# Patient Record
Sex: MALE | Race: OTHER | NOT HISPANIC OR LATINO | Employment: OTHER | URBAN - METROPOLITAN AREA
[De-identification: names, ages, dates, MRNs, and addresses within clinical notes are randomized per-mention and may not be internally consistent; named-entity substitution may affect disease eponyms.]

---

## 2024-02-06 ENCOUNTER — HOSPITAL ENCOUNTER (EMERGENCY)
Facility: HOSPITAL | Age: 79
Discharge: HOME | End: 2024-02-06
Attending: EMERGENCY MEDICINE
Payer: MEDICARE

## 2024-02-06 VITALS
TEMPERATURE: 99 F | DIASTOLIC BLOOD PRESSURE: 78 MMHG | SYSTOLIC BLOOD PRESSURE: 140 MMHG | HEART RATE: 74 BPM | OXYGEN SATURATION: 97 % | RESPIRATION RATE: 18 BRPM
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
## 2024-02-06 DIAGNOSIS — S40.011A CONTUSION OF MULTIPLE SITES OF RIGHT SHOULDER, INITIAL ENCOUNTER: Primary | ICD-10-CM

## 2024-02-06 DIAGNOSIS — S00.93XA CONTUSION OF HEAD, UNSPECIFIED PART OF HEAD, INITIAL ENCOUNTER: ICD-10-CM

## 2024-02-06 DIAGNOSIS — S01.81XA FACIAL LACERATION, INITIAL ENCOUNTER: ICD-10-CM

## 2024-02-06 PROCEDURE — 63600175 PHARM REV CODE 636 W HCPCS: Performed by: EMERGENCY MEDICINE

## 2024-02-06 PROCEDURE — 99285 EMERGENCY DEPT VISIT HI MDM: CPT | Mod: 25

## 2024-02-06 PROCEDURE — 90715 TDAP VACCINE 7 YRS/> IM: CPT | Performed by: EMERGENCY MEDICINE

## 2024-02-06 PROCEDURE — 90471 IMMUNIZATION ADMIN: CPT | Performed by: EMERGENCY MEDICINE

## 2024-02-06 RX ORDER — TRAMADOL HYDROCHLORIDE 50 MG/1
50 TABLET ORAL EVERY 6 HOURS PRN
Qty: 12 TABLET | Refills: 0 | Status: SHIPPED | OUTPATIENT
Start: 2024-02-06 | End: 2024-02-16

## 2024-02-06 RX ADMIN — TETANUS TOXOID, REDUCED DIPHTHERIA TOXOID AND ACELLULAR PERTUSSIS VACCINE, ADSORBED 0.5 ML: 5; 2.5; 8; 8; 2.5 SUSPENSION INTRAMUSCULAR at 02:02

## 2024-02-06 NOTE — DISCHARGE INSTRUCTIONS
There are no fractures or dislocations or evidence of intracranial injury on his imaging.  Please use ibuprofen for pain and Ultram for breakthrough pain.  Laceration of the face is well approximated with the Steri-Strips.  I would leave these in place.  Follow up with her doctor.  Return as needed

## 2024-02-06 NOTE — ED PROVIDER NOTES
SCRIBE #1 NOTE: I, Pedro Conde and Jose Pavon Jr., am scribing for, and in the presence of, Bryan Laird Jr., MD. I have scribed the entire note.       History     Chief Complaint   Patient presents with    Fall     Pt. Presents to ED via [facility] due having a seated fall today at his NH. Pt c/o right shoulder pain, and a lac noted to his right eyebrow. Pt. Denies LOC and denies blood thinners      Review of patient's allergies indicates:  No Known Allergies      History of Present Illness     HPI    Limited HPI and ROS due to patient's dementia.    2/6/2024, 11:41 AM  History obtained from the patient       History of Present Illness: Gabo Altamirano is a 78 y.o. male patient with a PMHx of dementia, PTSD, hypertension, bladder cancer, and gout who presents to the Emergency Department for evaluation of injuries due to a seated fall while at nursing home today. Pt c/o of right shoulder pain. Pt has a wound to right eyebrow ridge. Pt denies any LOC. Pt denies blood thinners.  Patient has an underlying history of dementia.  He was unable to give a very good history.  Lacerations over the right eye that has been closed with Steri-Strips      Arrival mode: [facility]    PCP: No primary care provider on file.        Past Medical History:  No past medical history on file.    Past Surgical History:  No past surgical history on file.      Family History:  No family history on file.    Social History:  Social History     Tobacco Use    Smoking status: Not on file    Smokeless tobacco: Not on file   Substance and Sexual Activity    Alcohol use: Not on file    Drug use: Not on file    Sexual activity: Not on file        Review of Systems     Review of Systems   Unable to perform ROS: Dementia   HENT:          (+) wound to right eyebrow ridge   Musculoskeletal:  Positive for arthralgias (right shoulder).   Neurological:         (-) LOC        Physical Exam     Initial Vitals [02/06/24 1128]   BP Pulse Resp Temp SpO2   (!)  142/87 78 18 98.6 °F (37 °C) 97 %      MAP       --          Physical Exam  GEN:  Underlying history of dementia.  Patient states that he is in [place] currently son states this is typical and baseline.  No acute distress.  HEENT:  Normocephalic . Extraocular muscles intact bilaterally. No evidence of entrapment.  No nasal deformity.  Nasal septum is midline.  There is no septal hematoma.  Tympanic membranes are normal. No hemotympanum.  Negative Rdz sign. No CSF leak.  Several small lacerations over the right lateral orbit.  These were closed with Steri-Strips.  These were very well approximated and healing well.  No sign of infection  CV:.  Regular rate and rhythm without gallops murmurs or rubs. 2+ pulses bilateral upper and lower extremities.  PULM:  Clear to auscultation bilaterally. No respiratory distress    Gi:  Soft nontender nondistended with normoactive bowel sounds  :.  No CVA tenderness. No suprapubic tenderness.  MS:  There is no point C/T/L/S tenderness. Normal spinal curvature.  Pelvis is stable nontender.  There is no chest wall tenderness.  Clavicles are nontender.  Mild tenderness over the lateral aspect of the right acromion.  All other bones have been palpated and joints ranged fully without tenderness or deformity.  NEURO:  II-XII intact bilaterally. No focal lateralizing signs.  Right median radial ulnar musculocutaneous and axillary nerves are intact on exam  SKIN:  Intact. No rash or laceration.        ED Course   Procedures  ED Vital Signs:  Vitals:    02/06/24 1128 02/06/24 1203 02/06/24 1204   BP: (!) 142/87 (!) 140/78    Pulse: 78  74   Resp: 18     Temp: 98.6 °F (37 °C)     TempSrc: Oral     SpO2: 97%  97%       Abnormal Lab Results:  Labs Reviewed - No data to display     All Lab Results:  No results found for this or any previous visit.      Imaging Results:  Imaging Results              CT Head Without Contrast (Final result)  Result time 02/06/24 12:57:27      Final result by  Lianet Villegas MD (02/06/24 12:57:27)                   Impression:      No acute intracranial abnormality.    Findings compatible with generalized cerebral atrophy and chronic microvascular ischemic change.      Electronically signed by: Lianet Villegas  Date:    02/06/2024  Time:    12:57               Narrative:    EXAMINATION:  CT HEAD WITHOUT CONTRAST    CLINICAL HISTORY:  Head trauma, minor (Age >= 65y);    TECHNIQUE:  Low dose axial CT images obtained throughout the head without intravenous contrast. Sagittal and coronal reconstructions were performed.    COMPARISON:  None.    FINDINGS:  Intracranial compartment:    There is diffuse ventricular and sulcal enlargement, compatible with generalized cerebral volume loss.  No hydrocephalus.  No extra-axial blood or fluid collections.    There is patchy hypoattenuation of the supratentorial white matter, most commonly seen in setting of chronic microvascular ischemic change.  No parenchymal mass, hemorrhage, edema or major vascular distribution infarct.    Skull/extracranial contents (limited evaluation): No fracture. There is mild mucosal thickening of the bilateral maxillary sinuses.  Remaining paranasal sinuses are clear.  Mastoid air cells are clear.                                       X-Ray Shoulder Trauma Right (Final result)  Result time 02/06/24 14:08:25      Final result by Tamanna Cardenas MD (02/06/24 14:08:25)                   Impression:      No acute fracture or dislocation.  Three-view suboptimal exam under penetrated      Electronically signed by: Tamanna Cardenas  Date:    02/06/2024  Time:    14:08               Narrative:    EXAMINATION:  XR SHOULDER TRAUMA 3 VIEW RIGHT    CLINICAL HISTORY:  XR SHOULDER TRAUMA 3 VIEW RIGHTUnspecified fall, initial encounter    COMPARISON:  None                                              The Emergency Provider reviewed the vital signs and test results, which are outlined above.     ED  Discussion     2:14 PM: Reassessed pt at this time. Discussed with pt all pertinent ED information and results. Discussed pt dx and plan of tx. Gave pt all f/u and return to the ED instructions. All questions and concerns were addressed at this time. Pt expresses understanding of information and instructions, and is comfortable with plan to discharge. Pt is stable for discharge.    I discussed with patient and/or family/caretaker that evaluation in the ED does not suggest any emergent or life threatening medical conditions requiring immediate intervention beyond what was provided in the ED, and I believe patient is safe for discharge.  Regardless, an unremarkable evaluation in the ED does not preclude the development or presence of a serious of life threatening condition. As such, patient was instructed to return immediately for any worsening or change in current symptoms.           Medical Decision Making  Differential diagnosis:  Fall, head contusion, intracranial injury, facial laceration, right shoulder pain, clavicle fracture    Patient was evaluated history and physical examination.  Patient was some tenderness over lateral shoulder as well as evidence of head injury.  CT imaging of the head is negative right shoulder films are negative as well.  Obvious fracture.  Patient was be discharged home with pain control.  He was laceration over his right eye however this is very well approximated with Steri-Strips and I will not make any changes at this time.  Tetanus updated.  Patient was stable safe for discharge in my opinion        Amount and/or Complexity of Data Reviewed  Independent Historian: friend and EMS  Radiology: ordered and independent interpretation performed. Decision-making details documented in ED Course.    Risk  OTC drugs.  Prescription drug management.  Decision regarding hospitalization.  Diagnosis or treatment significantly limited by social determinants of health.                ED  Medication(s):  Medications   Tdap (BOOSTRIX) vaccine injection 0.5 mL (has no administration in time range)       New Prescriptions    TRAMADOL (ULTRAM) 50 MG TABLET    Take 1 tablet (50 mg total) by mouth every 6 (six) hours as needed.               Scribe Attestation:   Scribe #1: I performed the above scribed service and the documentation accurately describes the services I performed. I attest to the accuracy of the note.     Attending:   Physician Attestation Statement for Scribe #1: I, Bryan Laird Jr., MD, personally performed the services described in this documentation, as scribed by Pedro Conde and Jose Pavon Jr., in my presence, and it is both accurate and complete.           Clinical Impression       ICD-10-CM ICD-9-CM   1. Contusion of multiple sites of right shoulder, initial encounter  S40.011A 923.09   2. Fall  W19.XXXA E888.9   3. Facial laceration, initial encounter  S01.81XA 873.40   4. Contusion of head, unspecified part of head, initial encounter  S00.93XA 920       Disposition:   Disposition: Discharged  Condition: Stable         Bryan Laird Jr., MD  02/06/24 4640

## 2024-02-06 NOTE — ED NOTES
Pt resting in ED stretcher comfortably, skin warm and dry, RR even and unlabored. BRIDGERS. NADN.

## 2024-06-21 ENCOUNTER — HOSPITAL ENCOUNTER (OUTPATIENT)
Facility: HOSPITAL | Age: 79
Discharge: HOME | End: 2024-06-22
Attending: EMERGENCY MEDICINE | Admitting: EMERGENCY MEDICINE
Payer: MEDICARE

## 2024-06-21 DIAGNOSIS — E86.0 MILD DEHYDRATION: ICD-10-CM

## 2024-06-21 DIAGNOSIS — R55 SYNCOPE: Primary | ICD-10-CM

## 2024-06-21 DIAGNOSIS — R79.89 ELEVATED TROPONIN: ICD-10-CM

## 2024-06-21 DIAGNOSIS — F02.B0 MODERATE LATE ONSET ALZHEIMER'S DEMENTIA WITHOUT BEHAVIORAL DISTURBANCE, PSYCHOTIC DISTURBANCE, MOOD DISTURBANCE, OR ANXIETY: ICD-10-CM

## 2024-06-21 DIAGNOSIS — G30.1 MODERATE LATE ONSET ALZHEIMER'S DEMENTIA WITHOUT BEHAVIORAL DISTURBANCE, PSYCHOTIC DISTURBANCE, MOOD DISTURBANCE, OR ANXIETY: ICD-10-CM

## 2024-06-21 LAB
ALBUMIN SERPL BCP-MCNC: 2.8 G/DL (ref 3.5–5.2)
ALP SERPL-CCNC: 92 U/L (ref 55–135)
ALT SERPL W/O P-5'-P-CCNC: 33 U/L (ref 10–44)
ANION GAP SERPL CALC-SCNC: 12 MMOL/L (ref 8–16)
AST SERPL-CCNC: 30 U/L (ref 10–40)
BASOPHILS # BLD AUTO: 0.1 K/UL (ref 0–0.2)
BASOPHILS NFR BLD: 0.6 % (ref 0–1.9)
BILIRUB SERPL-MCNC: 0.7 MG/DL (ref 0.1–1)
BILIRUB UR QL STRIP: NEGATIVE
BNP SERPL-MCNC: 55 PG/ML (ref 0–99)
BUN SERPL-MCNC: 12 MG/DL (ref 8–23)
CALCIUM SERPL-MCNC: 9.2 MG/DL (ref 8.7–10.5)
CHLORIDE SERPL-SCNC: 107 MMOL/L (ref 95–110)
CLARITY UR: ABNORMAL
CO2 SERPL-SCNC: 18 MMOL/L (ref 23–29)
COLOR UR: YELLOW
CREAT SERPL-MCNC: 1 MG/DL (ref 0.5–1.4)
DIFFERENTIAL METHOD BLD: ABNORMAL
EOSINOPHIL # BLD AUTO: 0.1 K/UL (ref 0–0.5)
EOSINOPHIL NFR BLD: 0.5 % (ref 0–8)
ERYTHROCYTE [DISTWIDTH] IN BLOOD BY AUTOMATED COUNT: 12.6 % (ref 11.5–14.5)
EST. GFR  (NO RACE VARIABLE): >60 ML/MIN/1.73 M^2
GLUCOSE SERPL-MCNC: 99 MG/DL (ref 70–110)
GLUCOSE UR QL STRIP: NEGATIVE
HCT VFR BLD AUTO: 44.3 % (ref 40–54)
HCV AB SERPL QL IA: NEGATIVE
HEP C VIRUS HOLD SPECIMEN: NORMAL
HGB BLD-MCNC: 14 G/DL (ref 14–18)
HGB UR QL STRIP: NEGATIVE
HIV 1+2 AB+HIV1 P24 AG SERPL QL IA: NEGATIVE
IMM GRANULOCYTES # BLD AUTO: 0.08 K/UL (ref 0–0.04)
IMM GRANULOCYTES NFR BLD AUTO: 0.5 % (ref 0–0.5)
KETONES UR QL STRIP: NEGATIVE
LEUKOCYTE ESTERASE UR QL STRIP: NEGATIVE
LYMPHOCYTES # BLD AUTO: 1.7 K/UL (ref 1–4.8)
LYMPHOCYTES NFR BLD: 10.4 % (ref 18–48)
MCH RBC QN AUTO: 29 PG (ref 27–31)
MCHC RBC AUTO-ENTMCNC: 31.6 G/DL (ref 32–36)
MCV RBC AUTO: 92 FL (ref 82–98)
MONOCYTES # BLD AUTO: 1.5 K/UL (ref 0.3–1)
MONOCYTES NFR BLD: 9.1 % (ref 4–15)
NEUTROPHILS # BLD AUTO: 12.9 K/UL (ref 1.8–7.7)
NEUTROPHILS NFR BLD: 78.9 % (ref 38–73)
NITRITE UR QL STRIP: NEGATIVE
NRBC BLD-RTO: 0 /100 WBC
OHS QRS DURATION: 88 MS
OHS QTC CALCULATION: 425 MS
PH UR STRIP: 8 [PH] (ref 5–8)
PLATELET # BLD AUTO: 319 K/UL (ref 150–450)
PMV BLD AUTO: 9.5 FL (ref 9.2–12.9)
POCT GLUCOSE: 114 MG/DL (ref 70–110)
POTASSIUM SERPL-SCNC: 4.4 MMOL/L (ref 3.5–5.1)
PROT SERPL-MCNC: 7.5 G/DL (ref 6–8.4)
PROT UR QL STRIP: NEGATIVE
RBC # BLD AUTO: 4.82 M/UL (ref 4.6–6.2)
SODIUM SERPL-SCNC: 137 MMOL/L (ref 136–145)
SP GR UR STRIP: 1.01 (ref 1–1.03)
TROPONIN I SERPL DL<=0.01 NG/ML-MCNC: 0.04 NG/ML (ref 0–0.03)
URN SPEC COLLECT METH UR: ABNORMAL
UROBILINOGEN UR STRIP-ACNC: NEGATIVE EU/DL
WBC # BLD AUTO: 16.38 K/UL (ref 3.9–12.7)

## 2024-06-21 PROCEDURE — 85025 COMPLETE CBC W/AUTO DIFF WBC: CPT | Performed by: EMERGENCY MEDICINE

## 2024-06-21 PROCEDURE — G0378 HOSPITAL OBSERVATION PER HR: HCPCS

## 2024-06-21 PROCEDURE — 80053 COMPREHEN METABOLIC PANEL: CPT | Performed by: EMERGENCY MEDICINE

## 2024-06-21 PROCEDURE — 93010 ELECTROCARDIOGRAM REPORT: CPT | Mod: ,,, | Performed by: INTERNAL MEDICINE

## 2024-06-21 PROCEDURE — 93005 ELECTROCARDIOGRAM TRACING: CPT

## 2024-06-21 PROCEDURE — 25000003 PHARM REV CODE 250: Performed by: EMERGENCY MEDICINE

## 2024-06-21 PROCEDURE — 82962 GLUCOSE BLOOD TEST: CPT

## 2024-06-21 PROCEDURE — P9612 CATHETERIZE FOR URINE SPEC: HCPCS

## 2024-06-21 PROCEDURE — 99285 EMERGENCY DEPT VISIT HI MDM: CPT | Mod: 25

## 2024-06-21 PROCEDURE — 87389 HIV-1 AG W/HIV-1&-2 AB AG IA: CPT | Performed by: EMERGENCY MEDICINE

## 2024-06-21 PROCEDURE — 86803 HEPATITIS C AB TEST: CPT | Performed by: EMERGENCY MEDICINE

## 2024-06-21 PROCEDURE — 81003 URINALYSIS AUTO W/O SCOPE: CPT | Performed by: EMERGENCY MEDICINE

## 2024-06-21 PROCEDURE — 83880 ASSAY OF NATRIURETIC PEPTIDE: CPT | Performed by: EMERGENCY MEDICINE

## 2024-06-21 PROCEDURE — 84484 ASSAY OF TROPONIN QUANT: CPT | Performed by: EMERGENCY MEDICINE

## 2024-06-21 RX ORDER — TALC
3 POWDER (GRAM) TOPICAL NIGHTLY
COMMUNITY
Start: 2024-01-09

## 2024-06-21 RX ORDER — DONEPEZIL HYDROCHLORIDE 10 MG/1
10 TABLET, FILM COATED ORAL DAILY
COMMUNITY
Start: 2024-06-19

## 2024-06-21 RX ORDER — RISPERIDONE 0.5 MG/1
0.5 TABLET ORAL DAILY
COMMUNITY

## 2024-06-21 RX ORDER — AMLODIPINE BESYLATE 10 MG/1
10 TABLET ORAL DAILY
COMMUNITY

## 2024-06-21 RX ORDER — FAMOTIDINE 20 MG/1
20 TABLET, FILM COATED ORAL 2 TIMES DAILY
COMMUNITY
Start: 2024-06-11

## 2024-06-21 RX ORDER — TALC
6 POWDER (GRAM) TOPICAL NIGHTLY PRN
Status: DISCONTINUED | OUTPATIENT
Start: 2024-06-21 | End: 2024-06-22 | Stop reason: HOSPADM

## 2024-06-21 RX ORDER — TRAMADOL HYDROCHLORIDE 50 MG/1
50 TABLET ORAL EVERY 6 HOURS PRN
Status: ON HOLD | COMMUNITY
End: 2024-06-22 | Stop reason: HOSPADM

## 2024-06-21 RX ORDER — ACETAMINOPHEN 325 MG/1
650 TABLET ORAL EVERY 4 HOURS PRN
Status: DISCONTINUED | OUTPATIENT
Start: 2024-06-21 | End: 2024-06-22 | Stop reason: HOSPADM

## 2024-06-21 RX ORDER — SENNOSIDES 8.6 MG/1
2 TABLET ORAL DAILY
COMMUNITY
Start: 2024-01-03

## 2024-06-21 RX ORDER — LACTULOSE 10 G/15ML
20 SOLUTION ORAL; RECTAL DAILY
COMMUNITY
Start: 2024-01-09

## 2024-06-21 RX ORDER — AMOXICILLIN 250 MG
1 CAPSULE ORAL 2 TIMES DAILY PRN
Status: DISCONTINUED | OUTPATIENT
Start: 2024-06-21 | End: 2024-06-22 | Stop reason: HOSPADM

## 2024-06-21 RX ORDER — DIVALPROEX SODIUM 125 MG/1
125 TABLET, DELAYED RELEASE ORAL 2 TIMES DAILY
COMMUNITY
Start: 2024-06-14

## 2024-06-21 RX ORDER — ASPIRIN 325 MG
325 TABLET ORAL
Status: COMPLETED | OUTPATIENT
Start: 2024-06-21 | End: 2024-06-21

## 2024-06-21 RX ORDER — FUROSEMIDE 20 MG/1
20 TABLET ORAL DAILY PRN
COMMUNITY
Start: 2024-06-07

## 2024-06-21 RX ORDER — SODIUM CHLORIDE 0.9 % (FLUSH) 0.9 %
10 SYRINGE (ML) INJECTION EVERY 8 HOURS
Status: DISCONTINUED | OUTPATIENT
Start: 2024-06-21 | End: 2024-06-22 | Stop reason: HOSPADM

## 2024-06-21 RX ORDER — SODIUM CHLORIDE 9 MG/ML
INJECTION, SOLUTION INTRAVENOUS CONTINUOUS
Status: DISCONTINUED | OUTPATIENT
Start: 2024-06-21 | End: 2024-06-22 | Stop reason: HOSPADM

## 2024-06-21 RX ORDER — NAPROXEN SODIUM 220 MG/1
81 TABLET, FILM COATED ORAL DAILY
COMMUNITY

## 2024-06-21 RX ORDER — RISPERIDONE 1 MG/1
1 TABLET ORAL NIGHTLY
COMMUNITY
Start: 2024-06-11

## 2024-06-21 RX ORDER — MEMANTINE HYDROCHLORIDE 10 MG/1
10 TABLET ORAL 2 TIMES DAILY
COMMUNITY

## 2024-06-21 RX ORDER — POLYETHYLENE GLYCOL 3350 17 G/17G
17 POWDER, FOR SOLUTION ORAL DAILY PRN
Status: DISCONTINUED | OUTPATIENT
Start: 2024-06-21 | End: 2024-06-22 | Stop reason: HOSPADM

## 2024-06-21 RX ADMIN — ASPIRIN 325 MG ORAL TABLET 325 MG: 325 PILL ORAL at 02:06

## 2024-06-21 NOTE — PHARMACY MED REC
"Admission Medication History     The home medication history was taken by Luc Reynolds.    You may go to "Admission" then "Reconcile Home Medications" tabs to review and/or act upon these items.     The home medication list has been updated by the Pharmacy department.   Please read ALL comments highlighted in yellow.   Please address this information as you see fit.    Feel free to contact us if you have any questions or require assistance.      Medications listed below were obtained from: Nursing home:  [facility]  (Not in a hospital admission)        Luc Reynolds  [id number]        Current Outpatient Medications on File Prior to Encounter   Medication Sig Dispense Refill Last Dose    amLODIPine (NORVASC) 10 MG tablet Take 10 mg by mouth once daily.   6/21/2024    aspirin 81 MG Chew Take 81 mg by mouth once daily.   6/21/2024    divalproex (DEPAKOTE) 125 MG EC tablet Take 125 mg by mouth 2 (two) times daily.   6/21/2024    donepeziL (ARICEPT) 10 MG tablet Take 10 mg by mouth once daily.   6/21/2024    famotidine (PEPCID) 20 MG tablet Take 20 mg by mouth 2 (two) times daily.   6/21/2024    furosemide (LASIX) 20 MG tablet Take 20 mg by mouth daily as needed.   6/21/2024    lactulose (CHRONULAC) 10 gram/15 mL solution Take 20 g by mouth once daily.   6/20/2024    melatonin (MELATIN) 3 mg tablet Take 3 mg by mouth nightly.   6/20/2024    memantine (NAMENDA) 10 MG Tab Take 10 mg by mouth 2 (two) times daily.   6/21/2024    risperiDONE (RISPERDAL) 0.5 MG Tab Take 0.5 mg by mouth once daily.   6/21/2024    risperiDONE (RISPERDAL) 1 MG tablet Take 1 mg by mouth every evening.   6/20/2024    senna (SENOKOT) 8.6 mg tablet Take 2 tablets by mouth once daily.   6/20/2024    traMADoL (ULTRAM) 50 mg tablet Take 50 mg by mouth every 6 (six) hours as needed for Pain.   5/26/2024                       .          "

## 2024-06-21 NOTE — PLAN OF CARE
"Received to room 309 via stretcher from ED. Son at bedside. Son answered the admit assessment questions as patient has AMS. Patient is oriented to self only. Bed alarm on. Cardiac monitoring in progress. Patient pulled IV out. Order for "ok to leave IV out" received. No s/s acute distress.  "

## 2024-06-21 NOTE — ED PROVIDER NOTES
SCRIBE #1 NOTE: I, José Miguel Garcia, am scribing for, and in the presence of, Andrea Baltazar DO. I have scribed the entire note.       History     Chief Complaint   Patient presents with    Altered Mental Status     Near syncope this am; staff at [facility] reports increased lethargy episode     Review of patient's allergies indicates:  No Known Allergies      History of Present Illness     HPI    6/21/2024, 11:31 AM  History obtained from the patient, patient's brother, and [facility],      History of Present Illness: Gabo Altamirano is a 78 y.o. male patient with a PMHx of dementia who presents to the Emergency Department for evaluation of AMS which onset gradually PTA. It was reported by the nursing home he is currently living in that the patient was noticeably lethargic and slumped over in his wheelchair this am. His brother mentions for the past few months he has been more downhill with more intermittent wheelchair use, less talkative, decreased memory, hallucinations, decreased bita but today in ER he seems to be more aware and responsive. Patient also notes some R foot pain.   Symptoms are constant and moderate in severity. No mitigating or exacerbating factors reported. No other associated sxs. Patient denies any seizures, abd pain, fever, dysuria, and all other sxs at this time. No further complaints or concerns at this time.       Arrival mode:  AAS    PCP: No, Primary Doctor        Past Medical History:  No past medical history on file.    Past Surgical History:  No past surgical history on file.      Family History:  No family history on file.    Social History:  Social History     Tobacco Use    Smoking status: Not on file    Smokeless tobacco: Not on file   Substance and Sexual Activity    Alcohol use: Not on file    Drug use: Not on file    Sexual activity: Not on file        Review of Systems     Review of Systems   Constitutional:  Negative for fever.   HENT:  Negative for sore throat.   "  Respiratory:  Negative for shortness of breath.    Cardiovascular:  Negative for chest pain.   Gastrointestinal:  Negative for abdominal pain and nausea.   Genitourinary:  Negative for dysuria.   Musculoskeletal:  Negative for back pain.   Skin:  Negative for rash.   Neurological:  Positive for syncope. Negative for seizures and weakness.        (+) Lethargy   Hematological:  Does not bruise/bleed easily.        Physical Exam     Initial Vitals [06/21/24 1116]   BP Pulse Resp Temp SpO2   124/80 (!) 117 18 98.5 °F (36.9 °C) 98 %      MAP       --          Physical Exam  Vitals reviewed.   Constitutional:       Appearance: Normal appearance.   HENT:      Head: Normocephalic and atraumatic.      Comments: Normal facial exam  Cardiovascular:      Comments: Tachycardic rate, regular rhythm, no murmurs noted  Pulmonary:      Effort: Pulmonary effort is normal.      Breath sounds: No wheezing.   Abdominal:      Palpations: Abdomen is soft.      Tenderness: There is no abdominal tenderness.   Musculoskeletal:         General: Normal range of motion.   Skin:     General: Skin is warm and dry.      Findings: No rash.   Neurological:      Mental Status: He is alert.      Comments: The patient is awake and alert, he knows that his name is "speedy".  He is able to answer basic questions and perform basic commands.  His brother at bedside states that this is much better than he has been for the past week.            ED Course   Procedures  ED Vital Signs:  Vitals:    06/21/24 1116 06/21/24 1402 06/21/24 1407 06/21/24 1520   BP: 124/80 122/88  (!) 109/56   Pulse: (!) 117  83 87   Resp: 18   18   Temp: 98.5 °F (36.9 °C)   98.6 °F (37 °C)   TempSrc: Oral      SpO2: 98%   98%   Weight:       Height:        06/21/24 1532 06/21/24 1540 06/21/24 1546 06/21/24 1910   BP: (!) 115/56   128/63   Pulse: 84 85  72   Resp:    18   Temp:    98.5 °F (36.9 °C)   TempSrc:       SpO2:    97%   Weight:   92.2 kg (203 lb 4.2 oz)    Height:   5' 10" " (1.778 m)     06/21/24 2000 06/22/24 0026 06/22/24 0400 06/22/24 0436   BP:  (!) 143/66  (!) 146/67   Pulse: 74 67 78 77   Resp:  18  17   Temp:  99.1 °F (37.3 °C)  98 °F (36.7 °C)   TempSrc:    Axillary   SpO2:  96%  97%   Weight:       Height:        06/22/24 0745 06/22/24 0833   BP: 123/69    Pulse: 79 93   Resp: 17    Temp: 97.7 °F (36.5 °C)    TempSrc:     SpO2: 98%    Weight:     Height:         Abnormal Lab Results:  Labs Reviewed   CBC W/ AUTO DIFFERENTIAL - Abnormal; Notable for the following components:       Result Value    WBC 16.38 (*)     MCHC 31.6 (*)     Gran # (ANC) 12.9 (*)     Immature Grans (Abs) 0.08 (*)     Mono # 1.5 (*)     Gran % 78.9 (*)     Lymph % 10.4 (*)     All other components within normal limits    Narrative:     Release to patient->Immediate   COMPREHENSIVE METABOLIC PANEL - Abnormal; Notable for the following components:    CO2 18 (*)     Albumin 2.8 (*)     All other components within normal limits    Narrative:     Release to patient->Immediate   TROPONIN I - Abnormal; Notable for the following components:    Troponin I 0.036 (*)     All other components within normal limits    Narrative:     Release to patient->Immediate   URINALYSIS, REFLEX TO URINE CULTURE - Abnormal; Notable for the following components:    Appearance, UA Hazy (*)     All other components within normal limits    Narrative:     Specimen Source->Urine   POCT GLUCOSE - Abnormal; Notable for the following components:    POCT Glucose 114 (*)     All other components within normal limits   HIV 1 / 2 ANTIBODY    Narrative:     Release to patient->Immediate   HEPATITIS C ANTIBODY    Narrative:     Release to patient->Immediate   HEP C VIRUS HOLD SPECIMEN    Narrative:     Release to patient->Immediate   B-TYPE NATRIURETIC PEPTIDE    Narrative:     Release to patient->Immediate        All Lab Results:  Results for orders placed or performed during the hospital encounter of 06/21/24   HIV 1/2 Ag/Ab (4th Gen)   Result  Value Ref Range    HIV 1/2 Ag/Ab Negative Negative   Hepatitis C Antibody   Result Value Ref Range    Hepatitis C Ab Negative Negative   HCV Virus Hold Specimen   Result Value Ref Range    HEP C Virus Hold Specimen Hold for HCV sendout    CBC auto differential   Result Value Ref Range    WBC 16.38 (H) 3.90 - 12.70 K/uL    RBC 4.82 4.60 - 6.20 M/uL    Hemoglobin 14.0 14.0 - 18.0 g/dL    Hematocrit 44.3 40.0 - 54.0 %    MCV 92 82 - 98 fL    MCH 29.0 27.0 - 31.0 pg    MCHC 31.6 (L) 32.0 - 36.0 g/dL    RDW 12.6 11.5 - 14.5 %    Platelets 319 150 - 450 K/uL    MPV 9.5 9.2 - 12.9 fL    Immature Granulocytes 0.5 0.0 - 0.5 %    Gran # (ANC) 12.9 (H) 1.8 - 7.7 K/uL    Immature Grans (Abs) 0.08 (H) 0.00 - 0.04 K/uL    Lymph # 1.7 1.0 - 4.8 K/uL    Mono # 1.5 (H) 0.3 - 1.0 K/uL    Eos # 0.1 0.0 - 0.5 K/uL    Baso # 0.10 0.00 - 0.20 K/uL    nRBC 0 0 /100 WBC    Gran % 78.9 (H) 38.0 - 73.0 %    Lymph % 10.4 (L) 18.0 - 48.0 %    Mono % 9.1 4.0 - 15.0 %    Eosinophil % 0.5 0.0 - 8.0 %    Basophil % 0.6 0.0 - 1.9 %    Differential Method Automated    Comprehensive metabolic panel   Result Value Ref Range    Sodium 137 136 - 145 mmol/L    Potassium 4.4 3.5 - 5.1 mmol/L    Chloride 107 95 - 110 mmol/L    CO2 18 (L) 23 - 29 mmol/L    Glucose 99 70 - 110 mg/dL    BUN 12 8 - 23 mg/dL    Creatinine 1.0 0.5 - 1.4 mg/dL    Calcium 9.2 8.7 - 10.5 mg/dL    Total Protein 7.5 6.0 - 8.4 g/dL    Albumin 2.8 (L) 3.5 - 5.2 g/dL    Total Bilirubin 0.7 0.1 - 1.0 mg/dL    Alkaline Phosphatase 92 55 - 135 U/L    AST 30 10 - 40 U/L    ALT 33 10 - 44 U/L    eGFR >60 >60 mL/min/1.73 m^2    Anion Gap 12 8 - 16 mmol/L   Troponin I   Result Value Ref Range    Troponin I 0.036 (H) 0.000 - 0.026 ng/mL   Brain natriuretic peptide   Result Value Ref Range    BNP 55 0 - 99 pg/mL   Urinalysis, Reflex to Urine Culture Urine, Catheterized    Specimen: Urine, Clean Catch   Result Value Ref Range    Specimen UA Urine, Catheterized     Color, UA Yellow Yellow, Straw,  Yadira    Appearance, UA Hazy (A) Clear    pH, UA 8.0 5.0 - 8.0    Specific Gravity, UA 1.010 1.005 - 1.030    Protein, UA Negative Negative    Glucose, UA Negative Negative    Ketones, UA Negative Negative    Bilirubin (UA) Negative Negative    Occult Blood UA Negative Negative    Nitrite, UA Negative Negative    Urobilinogen, UA Negative <2.0 EU/dL    Leukocytes, UA Negative Negative   CBC auto differential   Result Value Ref Range    WBC 11.50 3.90 - 12.70 K/uL    RBC 4.23 (L) 4.60 - 6.20 M/uL    Hemoglobin 12.2 (L) 14.0 - 18.0 g/dL    Hematocrit 38.4 (L) 40.0 - 54.0 %    MCV 91 82 - 98 fL    MCH 28.8 27.0 - 31.0 pg    MCHC 31.8 (L) 32.0 - 36.0 g/dL    RDW 12.7 11.5 - 14.5 %    Platelets 352 150 - 450 K/uL    MPV 9.4 9.2 - 12.9 fL    Immature Granulocytes 0.4 0.0 - 0.5 %    Gran # (ANC) 8.3 (H) 1.8 - 7.7 K/uL    Immature Grans (Abs) 0.05 (H) 0.00 - 0.04 K/uL    Lymph # 1.9 1.0 - 4.8 K/uL    Mono # 1.1 (H) 0.3 - 1.0 K/uL    Eos # 0.1 0.0 - 0.5 K/uL    Baso # 0.08 0.00 - 0.20 K/uL    nRBC 0 0 /100 WBC    Gran % 72.6 38.0 - 73.0 %    Lymph % 16.4 (L) 18.0 - 48.0 %    Mono % 9.2 4.0 - 15.0 %    Eosinophil % 0.7 0.0 - 8.0 %    Basophil % 0.7 0.0 - 1.9 %    Differential Method Automated    Comprehensive metabolic panel   Result Value Ref Range    Sodium 138 136 - 145 mmol/L    Potassium 4.0 3.5 - 5.1 mmol/L    Chloride 105 95 - 110 mmol/L    CO2 23 23 - 29 mmol/L    Glucose 93 70 - 110 mg/dL    BUN 11 8 - 23 mg/dL    Creatinine 0.9 0.5 - 1.4 mg/dL    Calcium 8.9 8.7 - 10.5 mg/dL    Total Protein 6.7 6.0 - 8.4 g/dL    Albumin 2.5 (L) 3.5 - 5.2 g/dL    Total Bilirubin 0.8 0.1 - 1.0 mg/dL    Alkaline Phosphatase 77 55 - 135 U/L    AST 24 10 - 40 U/L    ALT 29 10 - 44 U/L    eGFR >60 >60 mL/min/1.73 m^2    Anion Gap 10 8 - 16 mmol/L   Troponin I   Result Value Ref Range    Troponin I 0.036 (H) 0.000 - 0.026 ng/mL   EKG 12-lead   Result Value Ref Range    QRS Duration 88 ms    [facility] QTC Calculation 425 ms   POCT glucose    Result Value Ref Range    POCT Glucose 114 (H) 70 - 110 mg/dL         Imaging Results:  Imaging Results              X-Ray Chest AP Portable (Final result)  Result time 06/21/24 12:00:37      Final result by Marcus Fink MD (06/21/24 12:00:37)                   Impression:      No acute findings.      Electronically signed by: Marcus Fink MD  Date:    06/21/2024  Time:    12:00               Narrative:    EXAMINATION:  XR CHEST AP PORTABLE    CLINICAL HISTORY:  syncope;    TECHNIQUE:  Single frontal view of the chest was performed.    COMPARISON:  None    FINDINGS:  The cardiomediastinal silhouette is normal.  Partly rotated.    The lungs are clear.  No pleural effusions.    No acute osseous findings.  No advanced arthritic changes.                                       The EKG was ordered, reviewed, and independently interpreted by the ED provider.  Interpretation time: 11:40  Rate: 92 BPM  Rhythm: normal sinus rhythm  Interpretation: Left axis deviation. Abnormal ECG. No STEMI.             The Emergency Provider reviewed the vital signs and test results, which are outlined above.     ED Discussion     2:04 PM: Discussed case with REBEKAH Katz ([facility]). Dr. Madden agrees with current care and management of pt and accepts admission.   Admitting Service:   Admitting Physician: Dr. Madden  Admit to: obs/tele    2:04 PM: Re-evaluated pt. I have discussed test results, shared treatment plan, and the need for admission with patient and family at bedside. Pt and family express understanding at this time and agree with all information. All questions answered. Pt and family have no further questions or concerns at this time. Pt is ready for admit.        ED Course as of 06/22/24 2207 Fri Jun 21, 2024   1159 POCT glucose(!)  Hyperglycemia [CD]   1202 CBC auto differential(!)  Leukocytosis noted [CD]   1206 X-Ray Chest AP Portable  No acute findings [CD]   1238 Comprehensive metabolic  panel(!)  Nonspecific findings [CD]   1255 Troponin I(!)  Elevation [CD]   1257 Brain natriuretic peptide  Within normal limits [CD]   1300 Hepatitis C Antibody  Negative [CD]   1309 HIV 1/2 Ag/Ab (4th Gen)  Negative [CD]   1354 Urinalysis, Reflex to Urine Culture Urine, Catheterized(!)  No UTI [CD]      ED Course User Index  [CD] Andrea Baltazar DO     Medical Decision Making  This patient had a syncopal episode from a seated position while at the nursing home.  Workup shows evidence for elevated troponin with no other troponins to compare to.  Because of this Hospital Medicine was consulted for admission and which they accepted for further workup and treatment.    Amount and/or Complexity of Data Reviewed  Labs: ordered. Decision-making details documented in ED Course.  Radiology: ordered and independent interpretation performed. Decision-making details documented in ED Course.  ECG/medicine tests: ordered and independent interpretation performed. Decision-making details documented in ED Course.    Risk  OTC drugs.  Risk Details: Differential diagnosis includes but is not limited to: ACS, heart failure, dysrhythmia, dehydration, electrolyte abnormality                ED Medication(s):  Medications   aspirin tablet 325 mg (325 mg Oral Given 6/21/24 1424)       Discharge Medication List as of 6/22/2024 12:42 PM           Follow-up Information       No, Primary Doctor Follow up.    Why: FOLLOW UP WITH PCP AT [facility]                               Scribe Attestation:   Scribe #1: I performed the above scribed service and the documentation accurately describes the services I performed. I attest to the accuracy of the note.     Attending:   Physician Attestation Statement for Scribe #1: I, Andrea Baltazar DO, personally performed the services described in this documentation, as scribed by José Miguel Garcia, in my presence, and it is both accurate and complete.           Clinical Impression        ICD-10-CM ICD-9-CM   1. Syncope  R55 780.2   2. Elevated troponin  R79.89 790.6   3. Mild dehydration  E86.0 276.51   4. Moderate late onset Alzheimer's dementia without behavioral disturbance, psychotic disturbance, mood disturbance, or anxiety  G30.1 331.0    F02.B0 294.10       Disposition:   Disposition: Placed in Observation  Condition: Andrea Fregoso,   06/22/24 5145

## 2024-06-22 VITALS
DIASTOLIC BLOOD PRESSURE: 69 MMHG | RESPIRATION RATE: 17 BRPM | TEMPERATURE: 98 F | WEIGHT: 203.25 LBS | HEART RATE: 93 BPM | HEIGHT: 70 IN | BODY MASS INDEX: 29.1 KG/M2 | SYSTOLIC BLOOD PRESSURE: 123 MMHG | OXYGEN SATURATION: 98 %

## 2024-06-22 LAB
ALBUMIN SERPL BCP-MCNC: 2.5 G/DL (ref 3.5–5.2)
ALP SERPL-CCNC: 77 U/L (ref 55–135)
ALT SERPL W/O P-5'-P-CCNC: 29 U/L (ref 10–44)
ANION GAP SERPL CALC-SCNC: 10 MMOL/L (ref 8–16)
AST SERPL-CCNC: 24 U/L (ref 10–40)
BASOPHILS # BLD AUTO: 0.08 K/UL (ref 0–0.2)
BASOPHILS NFR BLD: 0.7 % (ref 0–1.9)
BILIRUB SERPL-MCNC: 0.8 MG/DL (ref 0.1–1)
BUN SERPL-MCNC: 11 MG/DL (ref 8–23)
CALCIUM SERPL-MCNC: 8.9 MG/DL (ref 8.7–10.5)
CHLORIDE SERPL-SCNC: 105 MMOL/L (ref 95–110)
CO2 SERPL-SCNC: 23 MMOL/L (ref 23–29)
CREAT SERPL-MCNC: 0.9 MG/DL (ref 0.5–1.4)
DIFFERENTIAL METHOD BLD: ABNORMAL
EOSINOPHIL # BLD AUTO: 0.1 K/UL (ref 0–0.5)
EOSINOPHIL NFR BLD: 0.7 % (ref 0–8)
ERYTHROCYTE [DISTWIDTH] IN BLOOD BY AUTOMATED COUNT: 12.7 % (ref 11.5–14.5)
EST. GFR  (NO RACE VARIABLE): >60 ML/MIN/1.73 M^2
GLUCOSE SERPL-MCNC: 93 MG/DL (ref 70–110)
HCT VFR BLD AUTO: 38.4 % (ref 40–54)
HGB BLD-MCNC: 12.2 G/DL (ref 14–18)
IMM GRANULOCYTES # BLD AUTO: 0.05 K/UL (ref 0–0.04)
IMM GRANULOCYTES NFR BLD AUTO: 0.4 % (ref 0–0.5)
LYMPHOCYTES # BLD AUTO: 1.9 K/UL (ref 1–4.8)
LYMPHOCYTES NFR BLD: 16.4 % (ref 18–48)
MCH RBC QN AUTO: 28.8 PG (ref 27–31)
MCHC RBC AUTO-ENTMCNC: 31.8 G/DL (ref 32–36)
MCV RBC AUTO: 91 FL (ref 82–98)
MONOCYTES # BLD AUTO: 1.1 K/UL (ref 0.3–1)
MONOCYTES NFR BLD: 9.2 % (ref 4–15)
NEUTROPHILS # BLD AUTO: 8.3 K/UL (ref 1.8–7.7)
NEUTROPHILS NFR BLD: 72.6 % (ref 38–73)
NRBC BLD-RTO: 0 /100 WBC
PLATELET # BLD AUTO: 352 K/UL (ref 150–450)
PMV BLD AUTO: 9.4 FL (ref 9.2–12.9)
POTASSIUM SERPL-SCNC: 4 MMOL/L (ref 3.5–5.1)
PROT SERPL-MCNC: 6.7 G/DL (ref 6–8.4)
RBC # BLD AUTO: 4.23 M/UL (ref 4.6–6.2)
SODIUM SERPL-SCNC: 138 MMOL/L (ref 136–145)
TROPONIN I SERPL DL<=0.01 NG/ML-MCNC: 0.04 NG/ML (ref 0–0.03)
WBC # BLD AUTO: 11.5 K/UL (ref 3.9–12.7)

## 2024-06-22 PROCEDURE — 80053 COMPREHEN METABOLIC PANEL: CPT

## 2024-06-22 PROCEDURE — G0378 HOSPITAL OBSERVATION PER HR: HCPCS

## 2024-06-22 PROCEDURE — 85025 COMPLETE CBC W/AUTO DIFF WBC: CPT

## 2024-06-22 PROCEDURE — 84484 ASSAY OF TROPONIN QUANT: CPT

## 2024-06-22 PROCEDURE — 36415 COLL VENOUS BLD VENIPUNCTURE: CPT

## 2024-06-22 PROCEDURE — 25000003 PHARM REV CODE 250

## 2024-06-22 RX ADMIN — COLCHICINE 0.3 MG: 0.6 TABLET, FILM COATED ORAL at 11:06

## 2024-06-22 NOTE — DISCHARGE SUMMARY
[facility]  [facility]  Discharge Summary      Patient Name: Gabo Altamirano  MRN: [id number]  NICO: [id number]  Patient Class: OP- Observation  Admission Date: 6/21/2024  Hospital Length of Stay: 0 days  Discharge Date and Time:  06/22/2024 9:38 AM  Attending Physician: Alfreda Madden MD   Discharging Provider: Alanna Sofia NP  Primary Care Provider: Maru, Primary Doctor    Primary Care Team: Networked reference to record PCT     HPI:   Gabo Altamirano is a 78 y.o. male patient with a PMHx of dementia, NH resident, sent to ER via EMS for AMS which onset gradually PTA. It was reported by the [facility] that the patient was noticeably lethargic and lumped over in his wheelchair this am. His brother mentions for the past few months he has been more downhill with more intermittent wheelchair use, less talkative, decreased memory, hallucinations, decreased bita but today in ER he seems to be more aware and responsive. Patient also notes some R foot pain.   Symptoms are constant and moderate in severity. No mitigating or exacerbating factors reported. No other associated sxs. Patient denies any seizures, abd pain, fever, dysuria, and all other sxs at this time. No further complaints or concerns at this time.     In the ER, VSS Afeb, , Labs WNL except WBC 16.4, H/H 14/43, CO2 18, Trop mildly elevated at 0.036, CXR Clear, EKG NSR. No ischemia. Pt is being placed in Obs under Hosp Med on Med Tele for near syncope and mild Dehydration.     * No surgery found *      Hospital Course:   78 year old male presented to the ER for AMS which onset gradually PTA.     In the ER, VSS Afeb, , Labs WNL except WBC 16.4, H/H 14/43, CO2 18, Trop mildly elevated at 0.036, CXR Clear, EKG NSR. No ischemia.     Patient given IVF overnight for mild dehydration. Patient awake and alert this morning eating breakfast. Mental status at baseline, patient remains only oriented to self. No focal deficits  noted. Patients labs overall stable from previous. Family reported patient has been progressively declining over the past couple of months. Advised to encourage PO intake as frequently as possible to ensure patient remains hydrated. Follow up with PCP at NH as soon as possible. Patient to be discharged back to [facility].        Patient seen and examined on the day of discharge.  All questions and concerns were addressed prior to discharge.    Face to face encounter with patient: 46 min      Goals of Care Treatment Preferences:  Code Status: Full Code      Consults:   Consults (From admission, onward)          Status Ordering Provider     Inpatient consult to Social Work  Once        Provider:  (Not yet assigned)    Completed DANGELO MORILLO            Neuro  Moderate late onset Alzheimer's dementia without behavioral disturbance, psychotic disturbance, mood disturbance, or anxiety  Patient with dementia with likely etiology of unknown dementia. Dementia is moderate. The patient does not have signs of behavioral disturbance. Home dementia medications are Held or Continued: held.. Continue non-pharmacologic interventions to prevent delirium (No VS between 11PM-5AM, activity during day, opening blinds, providing glasses/hearing aids, and up in chair during daytime). Will avoid narcotics and benzos unless absolutely necessary. PRN anti-psychotics are not prescribed to avoid self harm behaviors.    -Slowly progressive, pt is now dependent for almost all his ADLs, NH resident  -discharged back to nursing home      Renal/  Mild dehydration  Likely sec to poor intake  No NVD, use of diuretics etc  Pt is WC bound, with progressively decreased mobility over the last few months per son and pt's brother  Rehydrate w NS overnight     -labs improved and stable for discharge   -patient awake, alert and eating breakfast this morning         Other  * Near syncope  Likely sec to Dehydration. No evidence of any cardiac  "syncope or vasovagal, no sepsis, MI etc  Pt has already perked up in the ER  Needs gentle rehydration with NS      -patient back to baseline, much more awake and alert. Sitting up in bed eating breakfast this morning.  -overall labs stable for discharge   -discharged back to nursing home       Final Active Diagnoses:    Diagnosis Date Noted POA    PRINCIPAL PROBLEM:  Near syncope [R55] 06/21/2024 Yes    Mild dehydration [E86.0] 06/21/2024 Yes    Moderate late onset Alzheimer's dementia without behavioral disturbance, psychotic disturbance, mood disturbance, or anxiety [G30.1, F02.B0] 06/21/2024 Yes      Problems Resolved During this Admission:       Discharged Condition: good    Disposition: Home or Self Care    Follow Up:   Follow-up Information       No, Primary Doctor Follow up.    Why: FOLLOW UP WITH PCP AT [facility]                         Patient Instructions:      Diet Cardiac     Activity as tolerated       Significant Diagnostic Studies: Labs: BMP:   Recent Labs   Lab 06/21/24  1155 06/22/24  0743   GLU 99 93    138   K 4.4 4.0    105   CO2 18* 23   BUN 12 11   CREATININE 1.0 0.9   CALCIUM 9.2 8.9   , CMP   Recent Labs   Lab 06/21/24  1155 06/22/24  0743    138   K 4.4 4.0    105   CO2 18* 23   GLU 99 93   BUN 12 11   CREATININE 1.0 0.9   CALCIUM 9.2 8.9   PROT 7.5 6.7   ALBUMIN 2.8* 2.5*   BILITOT 0.7 0.8   ALKPHOS 92 77   AST 30 24   ALT 33 29   ANIONGAP 12 10   , CBC   Recent Labs   Lab 06/21/24  1155 06/22/24  0743   WBC 16.38* 11.50   HGB 14.0 12.2*   HCT 44.3 38.4*    352   , INR No results found for: "INR", "PROTIME", Lipid Panel   Lab Results   Component Value Date    CHOL 162 05/06/2021    HDL 43 05/06/2021    LDLCALC 105 (H) 05/06/2021    TRIG 74 05/06/2021   , Troponin   Recent Labs   Lab 06/21/24  1155 06/22/24  0743   TROPONINI 0.036* 0.036*   , A1C: No results for input(s): "HGBA1C" in the last 4320 hours., and All labs within the past 24 hours " have been reviewed  Radiology: X-Ray: CXR: X-Ray Chest 1 View (CXR): No results found for this visit on 06/21/24. and X-Ray Chest PA and Lateral (CXR): No results found for this visit on 06/21/24. and KUB: X-Ray Abdomen AP 1 View (KUB): No results found for this visit on 06/21/24.  Cardiac Graphics: ECG: Normal sinus rhythm     Pending Diagnostic Studies:       None           Medications:  Reconciled Home Medications:      Medication List        CONTINUE taking these medications      amLODIPine 10 MG tablet  Commonly known as: NORVASC  Take 10 mg by mouth once daily.     aspirin 81 MG Chew  Take 81 mg by mouth once daily.     divalproex 125 MG EC tablet  Commonly known as: DEPAKOTE  Take 125 mg by mouth 2 (two) times daily.     donepeziL 10 MG tablet  Commonly known as: ARICEPT  Take 10 mg by mouth once daily.     famotidine 20 MG tablet  Commonly known as: PEPCID  Take 20 mg by mouth 2 (two) times daily.     furosemide 20 MG tablet  Commonly known as: LASIX  Take 20 mg by mouth daily as needed.     lactulose 10 gram/15 mL solution  Commonly known as: CHRONULAC  Take 20 g by mouth once daily.     melatonin 3 mg tablet  Commonly known as: MELATIN  Take 3 mg by mouth nightly.     memantine 10 MG Tab  Commonly known as: NAMENDA  Take 10 mg by mouth 2 (two) times daily.     * risperiDONE 0.5 MG Tab  Commonly known as: RISPERDAL  Take 0.5 mg by mouth once daily.     * risperiDONE 1 MG tablet  Commonly known as: RISPERDAL  Take 1 mg by mouth every evening.     senna 8.6 mg tablet  Commonly known as: SENOKOT  Take 2 tablets by mouth once daily.           * This list has 2 medication(s) that are the same as other medications prescribed for you. Read the directions carefully, and ask your doctor or other care provider to review them with you.                STOP taking these medications      traMADoL 50 mg tablet  Commonly known as: ULTRAM              Indwelling Lines/Drains at time of discharge:   Lines/Drains/Airways        None                   Time spent on the discharge of patient: 55 minutes     The patient's case has been reviewed by my supervising physician.   Supervising physician will provide additional orders and recommendations at his/her discretion.  Please see supervising physicians documentation and/or orders for further details.       Alanna Sofia NP  Department of Hospital Medicine  [facility]

## 2024-06-22 NOTE — SUBJECTIVE & OBJECTIVE
No past medical history on file.    No past surgical history on file.    Review of patient's allergies indicates:  No Known Allergies    No current facility-administered medications on file prior to encounter.     Current Outpatient Medications on File Prior to Encounter   Medication Sig    amLODIPine (NORVASC) 10 MG tablet Take 10 mg by mouth once daily.    aspirin 81 MG Chew Take 81 mg by mouth once daily.    divalproex (DEPAKOTE) 125 MG EC tablet Take 125 mg by mouth 2 (two) times daily.    donepeziL (ARICEPT) 10 MG tablet Take 10 mg by mouth once daily.    famotidine (PEPCID) 20 MG tablet Take 20 mg by mouth 2 (two) times daily.    furosemide (LASIX) 20 MG tablet Take 20 mg by mouth daily as needed.    lactulose (CHRONULAC) 10 gram/15 mL solution Take 20 g by mouth once daily.    melatonin (MELATIN) 3 mg tablet Take 3 mg by mouth nightly.    memantine (NAMENDA) 10 MG Tab Take 10 mg by mouth 2 (two) times daily.    risperiDONE (RISPERDAL) 0.5 MG Tab Take 0.5 mg by mouth once daily.    risperiDONE (RISPERDAL) 1 MG tablet Take 1 mg by mouth every evening.    senna (SENOKOT) 8.6 mg tablet Take 2 tablets by mouth once daily.    traMADoL (ULTRAM) 50 mg tablet Take 50 mg by mouth every 6 (six) hours as needed for Pain.     Family History    None       Tobacco Use    Smoking status: Not on file    Smokeless tobacco: Not on file   Substance and Sexual Activity    Alcohol use: Not on file    Drug use: Not on file    Sexual activity: Not on file     Review of Systems   Constitutional:  Positive for activity change, appetite change and fatigue. Negative for chills, diaphoresis and fever.   HENT: Negative.  Negative for congestion, ear pain, facial swelling, hearing loss, nosebleeds, postnasal drip, rhinorrhea, sinus pressure, sneezing, sore throat, trouble swallowing and voice change.    Eyes: Negative.  Negative for pain and redness.   Respiratory: Negative.  Negative for cough, chest tightness, shortness of breath and  wheezing.    Cardiovascular: Negative.  Negative for chest pain, palpitations and leg swelling.   Gastrointestinal:  Negative for abdominal pain, blood in stool, constipation, diarrhea, nausea and vomiting.   Endocrine: Negative for cold intolerance, heat intolerance, polydipsia, polyphagia and polyuria.   Genitourinary:  Negative for difficulty urinating, dysuria, flank pain, frequency, hematuria, scrotal swelling, testicular pain and urgency.   Musculoskeletal:  Negative for arthralgias, back pain, gait problem, joint swelling, myalgias, neck pain and neck stiffness.   Skin:  Negative for pallor, rash and wound.   Allergic/Immunologic: Negative for environmental allergies and food allergies.   Neurological:  Positive for light-headedness. Negative for dizziness, tremors, seizures, syncope, facial asymmetry, speech difficulty, weakness, numbness and headaches.   Hematological: Negative.  Does not bruise/bleed easily.   Psychiatric/Behavioral:  Positive for decreased concentration.    All other systems reviewed and are negative.    Objective:     Vital Signs (Most Recent):  Temp: 98.5 °F (36.9 °C) (06/21/24 1910)  Pulse: 72 (06/21/24 1910)  Resp: 18 (06/21/24 1910)  BP: 128/63 (06/21/24 1910)  SpO2: 97 % (06/21/24 1910) Vital Signs (24h Range):  Temp:  [98.5 °F (36.9 °C)-98.6 °F (37 °C)] 98.5 °F (36.9 °C)  Pulse:  [] 72  Resp:  [18] 18  SpO2:  [97 %-98 %] 97 %  BP: (109-128)/(56-88) 128/63     Weight: 92.2 kg (203 lb 4.2 oz)  Body mass index is 29.17 kg/m².     Physical Exam  Vitals and nursing note reviewed.   Constitutional:       General: He is not in acute distress.     Appearance: Normal appearance. He is well-developed. He is not ill-appearing, toxic-appearing or diaphoretic.      Comments: Elderly man, AAOx3, pleasantly confused   HENT:      Head: Normocephalic and atraumatic.      Right Ear: External ear normal.      Left Ear: External ear normal.      Nose: Nose normal. No congestion.       Mouth/Throat:      Mouth: Mucous membranes are moist.      Pharynx: Oropharynx is clear.      Comments: Very narrow oropharynx  Eyes:      General: No scleral icterus.     Conjunctiva/sclera: Conjunctivae normal.      Pupils: Pupils are equal, round, and reactive to light.   Cardiovascular:      Rate and Rhythm: Normal rate and regular rhythm.      Pulses: Normal pulses.      Heart sounds: Normal heart sounds.   Pulmonary:      Effort: Pulmonary effort is normal. No respiratory distress.      Breath sounds: Normal breath sounds. No wheezing or rales.   Abdominal:      General: Abdomen is flat. Bowel sounds are normal. There is no distension.      Palpations: Abdomen is soft.      Tenderness: There is no abdominal tenderness. There is no guarding.   Genitourinary:     Rectum: Normal.      Comments: deferred  Musculoskeletal:         General: Normal range of motion.      Cervical back: Normal range of motion and neck supple.   Skin:     General: Skin is warm and dry.      Capillary Refill: Capillary refill takes less than 2 seconds.      Coloration: Skin is not jaundiced.   Neurological:      General: No focal deficit present.      Mental Status: He is alert and oriented to person, place, and time.      Deep Tendon Reflexes: Reflexes are normal and symmetric.   Psychiatric:         Mood and Affect: Mood normal.         Behavior: Behavior normal.          CRANIAL NERVES     CN III, IV, VI   Pupils are equal, round, and reactive to light.     Results for orders placed or performed during the hospital encounter of 06/21/24   HIV 1/2 Ag/Ab (4th Gen)   Result Value Ref Range    HIV 1/2 Ag/Ab Negative Negative   Hepatitis C Antibody   Result Value Ref Range    Hepatitis C Ab Negative Negative   HCV Virus Hold Specimen   Result Value Ref Range    HEP C Virus Hold Specimen Hold for HCV sendout    CBC auto differential   Result Value Ref Range    WBC 16.38 (H) 3.90 - 12.70 K/uL    RBC 4.82 4.60 - 6.20 M/uL    Hemoglobin 14.0  14.0 - 18.0 g/dL    Hematocrit 44.3 40.0 - 54.0 %    MCV 92 82 - 98 fL    MCH 29.0 27.0 - 31.0 pg    MCHC 31.6 (L) 32.0 - 36.0 g/dL    RDW 12.6 11.5 - 14.5 %    Platelets 319 150 - 450 K/uL    MPV 9.5 9.2 - 12.9 fL    Immature Granulocytes 0.5 0.0 - 0.5 %    Gran # (ANC) 12.9 (H) 1.8 - 7.7 K/uL    Immature Grans (Abs) 0.08 (H) 0.00 - 0.04 K/uL    Lymph # 1.7 1.0 - 4.8 K/uL    Mono # 1.5 (H) 0.3 - 1.0 K/uL    Eos # 0.1 0.0 - 0.5 K/uL    Baso # 0.10 0.00 - 0.20 K/uL    nRBC 0 0 /100 WBC    Gran % 78.9 (H) 38.0 - 73.0 %    Lymph % 10.4 (L) 18.0 - 48.0 %    Mono % 9.1 4.0 - 15.0 %    Eosinophil % 0.5 0.0 - 8.0 %    Basophil % 0.6 0.0 - 1.9 %    Differential Method Automated    Comprehensive metabolic panel   Result Value Ref Range    Sodium 137 136 - 145 mmol/L    Potassium 4.4 3.5 - 5.1 mmol/L    Chloride 107 95 - 110 mmol/L    CO2 18 (L) 23 - 29 mmol/L    Glucose 99 70 - 110 mg/dL    BUN 12 8 - 23 mg/dL    Creatinine 1.0 0.5 - 1.4 mg/dL    Calcium 9.2 8.7 - 10.5 mg/dL    Total Protein 7.5 6.0 - 8.4 g/dL    Albumin 2.8 (L) 3.5 - 5.2 g/dL    Total Bilirubin 0.7 0.1 - 1.0 mg/dL    Alkaline Phosphatase 92 55 - 135 U/L    AST 30 10 - 40 U/L    ALT 33 10 - 44 U/L    eGFR >60 >60 mL/min/1.73 m^2    Anion Gap 12 8 - 16 mmol/L   Troponin I   Result Value Ref Range    Troponin I 0.036 (H) 0.000 - 0.026 ng/mL   Brain natriuretic peptide   Result Value Ref Range    BNP 55 0 - 99 pg/mL   Urinalysis, Reflex to Urine Culture Urine, Catheterized    Specimen: Urine, Clean Catch   Result Value Ref Range    Specimen UA Urine, Catheterized     Color, UA Yellow Yellow, Straw, Yadira    Appearance, UA Hazy (A) Clear    pH, UA 8.0 5.0 - 8.0    Specific Gravity, UA 1.010 1.005 - 1.030    Protein, UA Negative Negative    Glucose, UA Negative Negative    Ketones, UA Negative Negative    Bilirubin (UA) Negative Negative    Occult Blood UA Negative Negative    Nitrite, UA Negative Negative    Urobilinogen, UA Negative <2.0 EU/dL    Leukocytes, UA  Negative Negative   EKG 12-lead   Result Value Ref Range    QRS Duration 88 ms    [facility] QTC Calculation 425 ms   POCT glucose   Result Value Ref Range    POCT Glucose 114 (H) 70 - 110 mg/dL        Significant Labs: All pertinent labs within the past 24 hours have been reviewed.    Imaging Results              X-Ray Chest AP Portable (Final result)  Result time 06/21/24 12:00:37      Final result by Marcus Fink MD (06/21/24 12:00:37)                   Impression:      No acute findings.      Electronically signed by: Marcus Fink MD  Date:    06/21/2024  Time:    12:00               Narrative:    EXAMINATION:  XR CHEST AP PORTABLE    CLINICAL HISTORY:  syncope;    TECHNIQUE:  Single frontal view of the chest was performed.    COMPARISON:  None    FINDINGS:  The cardiomediastinal silhouette is normal.  Partly rotated.    The lungs are clear.  No pleural effusions.    No acute osseous findings.  No advanced arthritic changes.                                     Significant Imaging: I have reviewed all pertinent imaging results/findings within the past 24 hours.

## 2024-06-22 NOTE — ASSESSMENT & PLAN NOTE
Likely sec to Dehydration. No evidence of any cardiac syncope or vasovagal, no sepsis, MI etc  Pt has already perked up in the ER  Needs gentle rehydration with NS      -patient back to baseline, much more awake and alert. Sitting up in bed eating breakfast this morning.  -overall labs stable for discharge   -discharged back to nursing home

## 2024-06-22 NOTE — H&P
[facility] [address] [place] [address] Medicine  History & Physical    Patient Name: Gabo Altamirano  MRN: [id number]  Patient Class: OP- Observation  Admission Date: 6/21/2024  Attending Physician: Alfreda Madden MD   Primary Care Provider: [facility]         Patient information was obtained from relative(s), past medical records, and ER records.     Subjective:     Principal Problem:Near syncope    Chief Complaint:   Chief Complaint   Patient presents with    Altered Mental Status     Near syncope this am; staff at [facility] reports increased lethargy episode        HPI: Gabo Altamirano is a 78 y.o. male patient with a PMHx of dementia, NH resident, sent to ER via EMS for AMS which onset gradually PTA. It was reported by the [facility] that the patient was noticeably lethargic and lumped over in his wheelchair this am. His brother mentions for the past few months he has been more downhill with more intermittent wheelchair use, less talkative, decreased memory, hallucinations, decreased bita but today in ER he seems to be more aware and responsive. Patient also notes some R foot pain.   Symptoms are constant and moderate in severity. No mitigating or exacerbating factors reported. No other associated sxs. Patient denies any seizures, abd pain, fever, dysuria, and all other sxs at this time. No further complaints or concerns at this time.     In the ER, VSS Afeb, , Labs WNL except WBC 16.4, H/H 14/43, CO2 18, Trop mildly elevated at 0.036, CXR Clear, EKG NSR. No ischemia. Pt is being placed in Obs under [facility] on Med Tele for near syncope and mild Dehydration.     No past medical history on file.    No past surgical history on file.    Review of patient's allergies indicates:  No Known Allergies    No current facility-administered medications on file prior to encounter.     Current Outpatient Medications on File Prior to Encounter   Medication Sig    amLODIPine (NORVASC) 10 MG tablet Take  10 mg by mouth once daily.    aspirin 81 MG Chew Take 81 mg by mouth once daily.    divalproex (DEPAKOTE) 125 MG EC tablet Take 125 mg by mouth 2 (two) times daily.    donepeziL (ARICEPT) 10 MG tablet Take 10 mg by mouth once daily.    famotidine (PEPCID) 20 MG tablet Take 20 mg by mouth 2 (two) times daily.    furosemide (LASIX) 20 MG tablet Take 20 mg by mouth daily as needed.    lactulose (CHRONULAC) 10 gram/15 mL solution Take 20 g by mouth once daily.    melatonin (MELATIN) 3 mg tablet Take 3 mg by mouth nightly.    memantine (NAMENDA) 10 MG Tab Take 10 mg by mouth 2 (two) times daily.    risperiDONE (RISPERDAL) 0.5 MG Tab Take 0.5 mg by mouth once daily.    risperiDONE (RISPERDAL) 1 MG tablet Take 1 mg by mouth every evening.    senna (SENOKOT) 8.6 mg tablet Take 2 tablets by mouth once daily.    traMADoL (ULTRAM) 50 mg tablet Take 50 mg by mouth every 6 (six) hours as needed for Pain.     Family History    None       Tobacco Use    Smoking status: Not on file    Smokeless tobacco: Not on file   Substance and Sexual Activity    Alcohol use: Not on file    Drug use: Not on file    Sexual activity: Not on file     Review of Systems   Constitutional:  Positive for activity change, appetite change and fatigue. Negative for chills, diaphoresis and fever.   HENT: Negative.  Negative for congestion, ear pain, facial swelling, hearing loss, nosebleeds, postnasal drip, rhinorrhea, sinus pressure, sneezing, sore throat, trouble swallowing and voice change.    Eyes: Negative.  Negative for pain and redness.   Respiratory: Negative.  Negative for cough, chest tightness, shortness of breath and wheezing.    Cardiovascular: Negative.  Negative for chest pain, palpitations and leg swelling.   Gastrointestinal:  Negative for abdominal pain, blood in stool, constipation, diarrhea, nausea and vomiting.   Endocrine: Negative for cold intolerance, heat intolerance, polydipsia, polyphagia and polyuria.   Genitourinary:  Negative  for difficulty urinating, dysuria, flank pain, frequency, hematuria, scrotal swelling, testicular pain and urgency.   Musculoskeletal:  Negative for arthralgias, back pain, gait problem, joint swelling, myalgias, neck pain and neck stiffness.   Skin:  Negative for pallor, rash and wound.   Allergic/Immunologic: Negative for environmental allergies and food allergies.   Neurological:  Positive for light-headedness. Negative for dizziness, tremors, seizures, syncope, facial asymmetry, speech difficulty, weakness, numbness and headaches.   Hematological: Negative.  Does not bruise/bleed easily.   Psychiatric/Behavioral:  Positive for decreased concentration.    All other systems reviewed and are negative.    Objective:     Vital Signs (Most Recent):  Temp: 98.5 °F (36.9 °C) (06/21/24 1910)  Pulse: 72 (06/21/24 1910)  Resp: 18 (06/21/24 1910)  BP: 128/63 (06/21/24 1910)  SpO2: 97 % (06/21/24 1910) Vital Signs (24h Range):  Temp:  [98.5 °F (36.9 °C)-98.6 °F (37 °C)] 98.5 °F (36.9 °C)  Pulse:  [] 72  Resp:  [18] 18  SpO2:  [97 %-98 %] 97 %  BP: (109-128)/(56-88) 128/63     Weight: 92.2 kg (203 lb 4.2 oz)  Body mass index is 29.17 kg/m².     Physical Exam  Vitals and nursing note reviewed.   Constitutional:       General: He is not in acute distress.     Appearance: Normal appearance. He is well-developed. He is not ill-appearing, toxic-appearing or diaphoretic.      Comments: Elderly man, AAOx3, pleasantly confused   HENT:      Head: Normocephalic and atraumatic.      Right Ear: External ear normal.      Left Ear: External ear normal.      Nose: Nose normal. No congestion.      Mouth/Throat:      Mouth: Mucous membranes are moist.      Pharynx: Oropharynx is clear.      Comments: Very narrow oropharynx  Eyes:      General: No scleral icterus.     Conjunctiva/sclera: Conjunctivae normal.      Pupils: Pupils are equal, round, and reactive to light.   Cardiovascular:      Rate and Rhythm: Normal rate and regular  rhythm.      Pulses: Normal pulses.      Heart sounds: Normal heart sounds.   Pulmonary:      Effort: Pulmonary effort is normal. No respiratory distress.      Breath sounds: Normal breath sounds. No wheezing or rales.   Abdominal:      General: Abdomen is flat. Bowel sounds are normal. There is no distension.      Palpations: Abdomen is soft.      Tenderness: There is no abdominal tenderness. There is no guarding.   Genitourinary:     Rectum: Normal.      Comments: deferred  Musculoskeletal:         General: Normal range of motion.      Cervical back: Normal range of motion and neck supple.   Skin:     General: Skin is warm and dry.      Capillary Refill: Capillary refill takes less than 2 seconds.      Coloration: Skin is not jaundiced.   Neurological:      General: No focal deficit present.      Mental Status: He is alert and oriented to person, place, and time.      Deep Tendon Reflexes: Reflexes are normal and symmetric.   Psychiatric:         Mood and Affect: Mood normal.         Behavior: Behavior normal.          CRANIAL NERVES     CN III, IV, VI   Pupils are equal, round, and reactive to light.     Results for orders placed or performed during the hospital encounter of 06/21/24   HIV 1/2 Ag/Ab (4th Gen)   Result Value Ref Range    HIV 1/2 Ag/Ab Negative Negative   Hepatitis C Antibody   Result Value Ref Range    Hepatitis C Ab Negative Negative   HCV Virus Hold Specimen   Result Value Ref Range    HEP C Virus Hold Specimen Hold for HCV sendout    CBC auto differential   Result Value Ref Range    WBC 16.38 (H) 3.90 - 12.70 K/uL    RBC 4.82 4.60 - 6.20 M/uL    Hemoglobin 14.0 14.0 - 18.0 g/dL    Hematocrit 44.3 40.0 - 54.0 %    MCV 92 82 - 98 fL    MCH 29.0 27.0 - 31.0 pg    MCHC 31.6 (L) 32.0 - 36.0 g/dL    RDW 12.6 11.5 - 14.5 %    Platelets 319 150 - 450 K/uL    MPV 9.5 9.2 - 12.9 fL    Immature Granulocytes 0.5 0.0 - 0.5 %    Gran # (ANC) 12.9 (H) 1.8 - 7.7 K/uL    Immature Grans (Abs) 0.08 (H) 0.00 -  0.04 K/uL    Lymph # 1.7 1.0 - 4.8 K/uL    Mono # 1.5 (H) 0.3 - 1.0 K/uL    Eos # 0.1 0.0 - 0.5 K/uL    Baso # 0.10 0.00 - 0.20 K/uL    nRBC 0 0 /100 WBC    Gran % 78.9 (H) 38.0 - 73.0 %    Lymph % 10.4 (L) 18.0 - 48.0 %    Mono % 9.1 4.0 - 15.0 %    Eosinophil % 0.5 0.0 - 8.0 %    Basophil % 0.6 0.0 - 1.9 %    Differential Method Automated    Comprehensive metabolic panel   Result Value Ref Range    Sodium 137 136 - 145 mmol/L    Potassium 4.4 3.5 - 5.1 mmol/L    Chloride 107 95 - 110 mmol/L    CO2 18 (L) 23 - 29 mmol/L    Glucose 99 70 - 110 mg/dL    BUN 12 8 - 23 mg/dL    Creatinine 1.0 0.5 - 1.4 mg/dL    Calcium 9.2 8.7 - 10.5 mg/dL    Total Protein 7.5 6.0 - 8.4 g/dL    Albumin 2.8 (L) 3.5 - 5.2 g/dL    Total Bilirubin 0.7 0.1 - 1.0 mg/dL    Alkaline Phosphatase 92 55 - 135 U/L    AST 30 10 - 40 U/L    ALT 33 10 - 44 U/L    eGFR >60 >60 mL/min/1.73 m^2    Anion Gap 12 8 - 16 mmol/L   Troponin I   Result Value Ref Range    Troponin I 0.036 (H) 0.000 - 0.026 ng/mL   Brain natriuretic peptide   Result Value Ref Range    BNP 55 0 - 99 pg/mL   Urinalysis, Reflex to Urine Culture Urine, Catheterized    Specimen: Urine, Clean Catch   Result Value Ref Range    Specimen UA Urine, Catheterized     Color, UA Yellow Yellow, Straw, Yadira    Appearance, UA Hazy (A) Clear    pH, UA 8.0 5.0 - 8.0    Specific Gravity, UA 1.010 1.005 - 1.030    Protein, UA Negative Negative    Glucose, UA Negative Negative    Ketones, UA Negative Negative    Bilirubin (UA) Negative Negative    Occult Blood UA Negative Negative    Nitrite, UA Negative Negative    Urobilinogen, UA Negative <2.0 EU/dL    Leukocytes, UA Negative Negative   EKG 12-lead   Result Value Ref Range    QRS Duration 88 ms    [facility] QTC Calculation 425 ms   POCT glucose   Result Value Ref Range    POCT Glucose 114 (H) 70 - 110 mg/dL        Significant Labs: All pertinent labs within the past 24 hours have been reviewed.    Imaging Results              X-Ray Chest AP Portable  (Final result)  Result time 06/21/24 12:00:37      Final result by Marcus Fink MD (06/21/24 12:00:37)                   Impression:      No acute findings.      Electronically signed by: Marcus Fink MD  Date:    06/21/2024  Time:    12:00               Narrative:    EXAMINATION:  XR CHEST AP PORTABLE    CLINICAL HISTORY:  syncope;    TECHNIQUE:  Single frontal view of the chest was performed.    COMPARISON:  None    FINDINGS:  The cardiomediastinal silhouette is normal.  Partly rotated.    The lungs are clear.  No pleural effusions.    No acute osseous findings.  No advanced arthritic changes.                                     Significant Imaging: I have reviewed all pertinent imaging results/findings within the past 24 hours.  Assessment/Plan:     * Near syncope  Likely sec to Dehydration. No evidence of any cardiac syncope or vasovagal, no sepsis, MI etc  Pt has already perked up in the ER  Needs gentle rehydration with NS      Moderate late onset Alzheimer's dementia without behavioral disturbance, psychotic disturbance, mood disturbance, or anxiety  Patient with dementia with likely etiology of unknown dementia. Dementia is moderate. The patient does not have signs of behavioral disturbance. Home dementia medications are Held or Continued: held.. Continue non-pharmacologic interventions to prevent delirium (No VS between 11PM-5AM, activity during day, opening blinds, providing glasses/hearing aids, and up in chair during daytime). Will avoid narcotics and benzos unless absolutely necessary. PRN anti-psychotics are not prescribed to avoid self harm behaviors.    Slowly progressive, pt is now dependent for almost all his ADLs, NH resident    Mild dehydration  Likely sec to poor intake  No NVD, use of diuretics etc  Pt is WC bound, with progressively decreased mobility over the last few months per son and pt's brother  Rehydrate w NS        VTE Risk Mitigation (From admission, onward)           Ordered      Reason for No Pharmacological VTE Prophylaxis  Once        Question:  Reasons:  Answer:  Risk of Bleeding    06/21/24 1601     IP VTE HIGH RISK PATIENT  Once         06/21/24 1601     Place sequential compression device  Until discontinued         06/21/24 1601                       On 06/21/2024, patient should be placed in hospital observation services under my care.             Alfreda Madden MD  Department of Hospital Medicine  [facility]

## 2024-06-22 NOTE — ASSESSMENT & PLAN NOTE
Patient with dementia with likely etiology of unknown dementia. Dementia is moderate. The patient does not have signs of behavioral disturbance. Home dementia medications are Held or Continued: held.. Continue non-pharmacologic interventions to prevent delirium (No VS between 11PM-5AM, activity during day, opening blinds, providing glasses/hearing aids, and up in chair during daytime). Will avoid narcotics and benzos unless absolutely necessary. PRN anti-psychotics are not prescribed to avoid self harm behaviors.    Slowly progressive, pt is now dependent for almost all his ADLs, NH resident

## 2024-06-22 NOTE — ASSESSMENT & PLAN NOTE
Patient with dementia with likely etiology of unknown dementia. Dementia is moderate. The patient does not have signs of behavioral disturbance. Home dementia medications are Held or Continued: held.. Continue non-pharmacologic interventions to prevent delirium (No VS between 11PM-5AM, activity during day, opening blinds, providing glasses/hearing aids, and up in chair during daytime). Will avoid narcotics and benzos unless absolutely necessary. PRN anti-psychotics are not prescribed to avoid self harm behaviors.    -Slowly progressive, pt is now dependent for almost all his ADLs, NH resident  -discharged back to nursing home

## 2024-06-22 NOTE — ASSESSMENT & PLAN NOTE
Likely sec to Dehydration. No evidence of any cardiac syncope or vasovagal, no sepsis, MI etc  Pt has already perked up in the ER  Needs gentle rehydration with NS

## 2024-06-22 NOTE — HOSPITAL COURSE
78 year old male presented to the ER for AMS which onset gradually PTA.     In the ER, VSS Afeb, , Labs WNL except WBC 16.4, H/H 14/43, CO2 18, Trop mildly elevated at 0.036, CXR Clear, EKG NSR. No ischemia.     Patient given IVF overnight for mild dehydration. Patient awake and alert this morning eating breakfast. Mental status at baseline, patient remains only oriented to self. No focal deficits noted. Patients labs overall stable from previous. Family reported patient has been progressively declining over the past couple of months. Advised to encourage PO intake as frequently as possible to ensure patient remains hydrated. Follow up with PCP at NH as soon as possible. Patient to be discharged back to [facility].        Patient seen and examined on the day of discharge.  All questions and concerns were addressed prior to discharge.    Face to face encounter with patient: 46 min

## 2024-06-22 NOTE — ASSESSMENT & PLAN NOTE
Likely sec to poor intake  No NVD, use of diuretics etc  Pt is WC bound, with progressively decreased mobility over the last few months per son and pt's brother  Rehydrate w NS

## 2024-06-22 NOTE — PLAN OF CARE
06/22/24 0933   Final Note   Assessment Type Final Discharge Note   Anticipated Discharge Disposition [facility], [place]   Hospital Resources/Appts/Education Provided Appointments scheduled and added to AVS     [facility] going [facility] to [facility].

## 2024-06-22 NOTE — ASSESSMENT & PLAN NOTE
Likely sec to poor intake  No NVD, use of diuretics etc  Pt is WC bound, with progressively decreased mobility over the last few months per son and pt's brother  Rehydrate w NS overnight     -labs improved and stable for discharge   -patient awake, alert and eating breakfast this morning

## 2024-06-22 NOTE — HPI
Gabo Altamirano is a 78 y.o. male patient with a PMHx of dementia, NH resident, sent to ER via EMS for AMS which onset gradually PTA. It was reported by the [facility] that the patient was noticeably lethargic and lumped over in his wheelchair this am. His brother mentions for the past few months he has been more downhill with more intermittent wheelchair use, less talkative, decreased memory, hallucinations, decreased bita but today in ER he seems to be more aware and responsive. Patient also notes some R foot pain.   Symptoms are constant and moderate in severity. No mitigating or exacerbating factors reported. No other associated sxs. Patient denies any seizures, abd pain, fever, dysuria, and all other sxs at this time. No further complaints or concerns at this time.     In the ER, VSS Afeb, , Labs WNL except WBC 16.4, H/H 14/43, CO2 18, Trop mildly elevated at 0.036, CXR Clear, EKG NSR. No ischemia. Pt is being placed in Obs under Hosp Med on Med Tele for near syncope and mild Dehydration.

## 2024-07-15 ENCOUNTER — HOSPITAL ENCOUNTER (INPATIENT)
Facility: HOSPITAL | Age: 79
LOS: 1 days | Discharge: HOME | DRG: 690 | End: 2024-07-17
Attending: EMERGENCY MEDICINE | Admitting: HOSPITALIST
Payer: MEDICARE

## 2024-07-15 DIAGNOSIS — F02.83 LATE ONSET ALZHEIMER'S DEMENTIA WITH MOOD DISTURBANCE, UNSPECIFIED DEMENTIA SEVERITY: ICD-10-CM

## 2024-07-15 DIAGNOSIS — R55 SYNCOPE: ICD-10-CM

## 2024-07-15 DIAGNOSIS — G30.1 LATE ONSET ALZHEIMER'S DEMENTIA WITH MOOD DISTURBANCE, UNSPECIFIED DEMENTIA SEVERITY: ICD-10-CM

## 2024-07-15 DIAGNOSIS — R07.9 CHEST PAIN: ICD-10-CM

## 2024-07-15 DIAGNOSIS — N39.0 URINARY TRACT INFECTION WITHOUT HEMATURIA, SITE UNSPECIFIED: Primary | ICD-10-CM

## 2024-07-15 PROBLEM — N30.01 ACUTE CYSTITIS WITH HEMATURIA: Status: ACTIVE | Noted: 2024-07-15

## 2024-07-15 PROBLEM — F41.1 GAD (GENERALIZED ANXIETY DISORDER): Status: ACTIVE | Noted: 2024-07-15

## 2024-07-15 PROBLEM — F43.10 PTSD (POST-TRAUMATIC STRESS DISORDER): Status: ACTIVE | Noted: 2024-07-15

## 2024-07-15 PROBLEM — I10 ESSENTIAL HYPERTENSION: Status: ACTIVE | Noted: 2024-07-15

## 2024-07-15 PROBLEM — M10.9 GOUT: Status: ACTIVE | Noted: 2024-07-15

## 2024-07-15 PROBLEM — N18.31 STAGE 3A CHRONIC KIDNEY DISEASE: Status: ACTIVE | Noted: 2024-07-15

## 2024-07-15 LAB
ALBUMIN SERPL BCP-MCNC: 2.6 G/DL (ref 3.5–5.2)
ALP SERPL-CCNC: 98 U/L (ref 55–135)
ALT SERPL W/O P-5'-P-CCNC: 63 U/L (ref 10–44)
ANION GAP SERPL CALC-SCNC: 11 MMOL/L (ref 8–16)
AST SERPL-CCNC: 51 U/L (ref 10–40)
BACTERIA #/AREA URNS HPF: ABNORMAL /HPF
BASOPHILS # BLD AUTO: 0.06 K/UL (ref 0–0.2)
BASOPHILS NFR BLD: 0.4 % (ref 0–1.9)
BILIRUB SERPL-MCNC: 0.5 MG/DL (ref 0.1–1)
BILIRUB UR QL STRIP: NEGATIVE
BNP SERPL-MCNC: 42 PG/ML (ref 0–99)
BUN SERPL-MCNC: 15 MG/DL (ref 8–23)
CALCIUM SERPL-MCNC: 8.8 MG/DL (ref 8.7–10.5)
CHLORIDE SERPL-SCNC: 106 MMOL/L (ref 95–110)
CLARITY UR: ABNORMAL
CO2 SERPL-SCNC: 23 MMOL/L (ref 23–29)
COLOR UR: YELLOW
CREAT SERPL-MCNC: 1 MG/DL (ref 0.5–1.4)
DIFFERENTIAL METHOD BLD: ABNORMAL
EOSINOPHIL # BLD AUTO: 0.1 K/UL (ref 0–0.5)
EOSINOPHIL NFR BLD: 0.9 % (ref 0–8)
ERYTHROCYTE [DISTWIDTH] IN BLOOD BY AUTOMATED COUNT: 13.1 % (ref 11.5–14.5)
EST. GFR  (NO RACE VARIABLE): >60 ML/MIN/1.73 M^2
GLUCOSE SERPL-MCNC: 129 MG/DL (ref 70–110)
GLUCOSE UR QL STRIP: NEGATIVE
HCT VFR BLD AUTO: 40 % (ref 40–54)
HGB BLD-MCNC: 12.6 G/DL (ref 14–18)
HGB UR QL STRIP: ABNORMAL
HYALINE CASTS #/AREA URNS LPF: 3 /LPF
IMM GRANULOCYTES # BLD AUTO: 0.11 K/UL (ref 0–0.04)
IMM GRANULOCYTES NFR BLD AUTO: 0.7 % (ref 0–0.5)
KETONES UR QL STRIP: NEGATIVE
LACTATE SERPL-SCNC: 1.3 MMOL/L (ref 0.5–2.2)
LEUKOCYTE ESTERASE UR QL STRIP: ABNORMAL
LYMPHOCYTES # BLD AUTO: 1.5 K/UL (ref 1–4.8)
LYMPHOCYTES NFR BLD: 9.8 % (ref 18–48)
MCH RBC QN AUTO: 28.9 PG (ref 27–31)
MCHC RBC AUTO-ENTMCNC: 31.5 G/DL (ref 32–36)
MCV RBC AUTO: 92 FL (ref 82–98)
MICROSCOPIC COMMENT: ABNORMAL
MONOCYTES # BLD AUTO: 1.1 K/UL (ref 0.3–1)
MONOCYTES NFR BLD: 7.4 % (ref 4–15)
NEUTROPHILS # BLD AUTO: 12.2 K/UL (ref 1.8–7.7)
NEUTROPHILS NFR BLD: 80.8 % (ref 38–73)
NITRITE UR QL STRIP: POSITIVE
NRBC BLD-RTO: 0 /100 WBC
PH UR STRIP: 6 [PH] (ref 5–8)
PLATELET # BLD AUTO: 417 K/UL (ref 150–450)
PMV BLD AUTO: 9.6 FL (ref 9.2–12.9)
POTASSIUM SERPL-SCNC: 3.9 MMOL/L (ref 3.5–5.1)
PROT SERPL-MCNC: 7.2 G/DL (ref 6–8.4)
PROT UR QL STRIP: ABNORMAL
RBC # BLD AUTO: 4.36 M/UL (ref 4.6–6.2)
RBC #/AREA URNS HPF: 7 /HPF (ref 0–4)
SODIUM SERPL-SCNC: 140 MMOL/L (ref 136–145)
SP GR UR STRIP: 1.01 (ref 1–1.03)
SQUAMOUS #/AREA URNS HPF: 8 /HPF
TROPONIN I SERPL DL<=0.01 NG/ML-MCNC: 0.01 NG/ML (ref 0–0.03)
TROPONIN I SERPL DL<=0.01 NG/ML-MCNC: 0.01 NG/ML (ref 0–0.03)
UNIDENT CRYS URNS QL MICRO: ABNORMAL
URATE SERPL-MCNC: 6.3 MG/DL (ref 3.4–7)
URN SPEC COLLECT METH UR: ABNORMAL
UROBILINOGEN UR STRIP-ACNC: ABNORMAL EU/DL
VALPROATE SERPL-MCNC: <12.5 UG/ML (ref 50–100)
WBC # BLD AUTO: 15.14 K/UL (ref 3.9–12.7)
WBC #/AREA URNS HPF: >100 /HPF (ref 0–5)
WBC CLUMPS URNS QL MICRO: ABNORMAL

## 2024-07-15 PROCEDURE — 25000003 PHARM REV CODE 250: Performed by: EMERGENCY MEDICINE

## 2024-07-15 PROCEDURE — 96361 HYDRATE IV INFUSION ADD-ON: CPT

## 2024-07-15 PROCEDURE — 84484 ASSAY OF TROPONIN QUANT: CPT | Performed by: EMERGENCY MEDICINE

## 2024-07-15 PROCEDURE — 85025 COMPLETE CBC W/AUTO DIFF WBC: CPT | Performed by: EMERGENCY MEDICINE

## 2024-07-15 PROCEDURE — 83880 ASSAY OF NATRIURETIC PEPTIDE: CPT | Performed by: EMERGENCY MEDICINE

## 2024-07-15 PROCEDURE — 96372 THER/PROPH/DIAG INJ SC/IM: CPT | Performed by: NURSE PRACTITIONER

## 2024-07-15 PROCEDURE — 96365 THER/PROPH/DIAG IV INF INIT: CPT

## 2024-07-15 PROCEDURE — 87088 URINE BACTERIA CULTURE: CPT | Performed by: EMERGENCY MEDICINE

## 2024-07-15 PROCEDURE — 36415 COLL VENOUS BLD VENIPUNCTURE: CPT | Performed by: NURSE PRACTITIONER

## 2024-07-15 PROCEDURE — G0378 HOSPITAL OBSERVATION PER HR: HCPCS

## 2024-07-15 PROCEDURE — 81000 URINALYSIS NONAUTO W/SCOPE: CPT | Performed by: EMERGENCY MEDICINE

## 2024-07-15 PROCEDURE — S5010 5% DEXTROSE AND 0.45% SALINE: HCPCS | Performed by: NURSE PRACTITIONER

## 2024-07-15 PROCEDURE — 96360 HYDRATION IV INFUSION INIT: CPT | Mod: 59

## 2024-07-15 PROCEDURE — 93005 ELECTROCARDIOGRAM TRACING: CPT

## 2024-07-15 PROCEDURE — 25000003 PHARM REV CODE 250: Performed by: NURSE PRACTITIONER

## 2024-07-15 PROCEDURE — 63600175 PHARM REV CODE 636 W HCPCS: Performed by: NURSE PRACTITIONER

## 2024-07-15 PROCEDURE — 84484 ASSAY OF TROPONIN QUANT: CPT | Mod: 91 | Performed by: NURSE PRACTITIONER

## 2024-07-15 PROCEDURE — 63600175 PHARM REV CODE 636 W HCPCS: Performed by: EMERGENCY MEDICINE

## 2024-07-15 PROCEDURE — 80053 COMPREHEN METABOLIC PANEL: CPT | Performed by: EMERGENCY MEDICINE

## 2024-07-15 PROCEDURE — 99285 EMERGENCY DEPT VISIT HI MDM: CPT | Mod: 25

## 2024-07-15 PROCEDURE — 87040 BLOOD CULTURE FOR BACTERIA: CPT | Performed by: EMERGENCY MEDICINE

## 2024-07-15 PROCEDURE — 87086 URINE CULTURE/COLONY COUNT: CPT | Performed by: EMERGENCY MEDICINE

## 2024-07-15 PROCEDURE — 87186 SC STD MICRODIL/AGAR DIL: CPT | Performed by: EMERGENCY MEDICINE

## 2024-07-15 PROCEDURE — 84550 ASSAY OF BLOOD/URIC ACID: CPT | Performed by: NURSE PRACTITIONER

## 2024-07-15 PROCEDURE — 83605 ASSAY OF LACTIC ACID: CPT | Performed by: EMERGENCY MEDICINE

## 2024-07-15 PROCEDURE — 93010 ELECTROCARDIOGRAM REPORT: CPT | Mod: ,,, | Performed by: INTERNAL MEDICINE

## 2024-07-15 PROCEDURE — 80164 ASSAY DIPROPYLACETIC ACD TOT: CPT | Performed by: NURSE PRACTITIONER

## 2024-07-15 RX ORDER — SENNOSIDES 8.6 MG/1
2 TABLET ORAL DAILY
Status: DISCONTINUED | OUTPATIENT
Start: 2024-07-16 | End: 2024-07-17 | Stop reason: HOSPADM

## 2024-07-15 RX ORDER — ONDANSETRON HYDROCHLORIDE 2 MG/ML
4 INJECTION, SOLUTION INTRAVENOUS EVERY 8 HOURS PRN
Status: DISCONTINUED | OUTPATIENT
Start: 2024-07-15 | End: 2024-07-17 | Stop reason: HOSPADM

## 2024-07-15 RX ORDER — ENOXAPARIN SODIUM 100 MG/ML
40 INJECTION SUBCUTANEOUS EVERY 24 HOURS
Status: DISCONTINUED | OUTPATIENT
Start: 2024-07-15 | End: 2024-07-17 | Stop reason: HOSPADM

## 2024-07-15 RX ORDER — NAPROXEN SODIUM 220 MG/1
81 TABLET, FILM COATED ORAL DAILY
Status: DISCONTINUED | OUTPATIENT
Start: 2024-07-16 | End: 2024-07-17 | Stop reason: HOSPADM

## 2024-07-15 RX ORDER — LACTULOSE 10 G/15ML
20 SOLUTION ORAL DAILY
Status: DISCONTINUED | OUTPATIENT
Start: 2024-07-16 | End: 2024-07-17 | Stop reason: HOSPADM

## 2024-07-15 RX ORDER — HYDRALAZINE HYDROCHLORIDE 20 MG/ML
10 INJECTION INTRAMUSCULAR; INTRAVENOUS EVERY 6 HOURS PRN
Status: DISCONTINUED | OUTPATIENT
Start: 2024-07-15 | End: 2024-07-17 | Stop reason: HOSPADM

## 2024-07-15 RX ORDER — TALC
3 POWDER (GRAM) TOPICAL NIGHTLY
Status: DISCONTINUED | OUTPATIENT
Start: 2024-07-15 | End: 2024-07-17 | Stop reason: HOSPADM

## 2024-07-15 RX ORDER — RISPERIDONE 0.5 MG/1
0.5 TABLET ORAL DAILY
Status: DISCONTINUED | OUTPATIENT
Start: 2024-07-16 | End: 2024-07-17 | Stop reason: HOSPADM

## 2024-07-15 RX ORDER — IBUPROFEN 200 MG
24 TABLET ORAL
Status: DISCONTINUED | OUTPATIENT
Start: 2024-07-15 | End: 2024-07-17 | Stop reason: HOSPADM

## 2024-07-15 RX ORDER — DEXTROSE MONOHYDRATE AND SODIUM CHLORIDE 5; .45 G/100ML; G/100ML
INJECTION, SOLUTION INTRAVENOUS CONTINUOUS
Status: DISCONTINUED | OUTPATIENT
Start: 2024-07-15 | End: 2024-07-17

## 2024-07-15 RX ORDER — ACETAMINOPHEN 325 MG/1
650 TABLET ORAL EVERY 4 HOURS PRN
Status: DISCONTINUED | OUTPATIENT
Start: 2024-07-15 | End: 2024-07-17 | Stop reason: HOSPADM

## 2024-07-15 RX ORDER — FAMOTIDINE 20 MG/1
20 TABLET, FILM COATED ORAL 2 TIMES DAILY
Status: DISCONTINUED | OUTPATIENT
Start: 2024-07-15 | End: 2024-07-17 | Stop reason: HOSPADM

## 2024-07-15 RX ORDER — RISPERIDONE 0.5 MG/1
1 TABLET ORAL NIGHTLY
Status: DISCONTINUED | OUTPATIENT
Start: 2024-07-15 | End: 2024-07-17 | Stop reason: HOSPADM

## 2024-07-15 RX ORDER — IPRATROPIUM BROMIDE AND ALBUTEROL SULFATE 2.5; .5 MG/3ML; MG/3ML
3 SOLUTION RESPIRATORY (INHALATION) EVERY 6 HOURS PRN
Status: DISCONTINUED | OUTPATIENT
Start: 2024-07-15 | End: 2024-07-17 | Stop reason: HOSPADM

## 2024-07-15 RX ORDER — MEMANTINE HYDROCHLORIDE 10 MG/1
10 TABLET ORAL 2 TIMES DAILY
Status: DISCONTINUED | OUTPATIENT
Start: 2024-07-15 | End: 2024-07-17 | Stop reason: HOSPADM

## 2024-07-15 RX ORDER — GLUCAGON 1 MG
1 KIT INJECTION
Status: DISCONTINUED | OUTPATIENT
Start: 2024-07-15 | End: 2024-07-17 | Stop reason: HOSPADM

## 2024-07-15 RX ORDER — IBUPROFEN 200 MG
16 TABLET ORAL
Status: DISCONTINUED | OUTPATIENT
Start: 2024-07-15 | End: 2024-07-17 | Stop reason: HOSPADM

## 2024-07-15 RX ORDER — DIVALPROEX SODIUM 125 MG/1
125 CAPSULE, COATED PELLETS ORAL 2 TIMES DAILY
Status: DISCONTINUED | OUTPATIENT
Start: 2024-07-15 | End: 2024-07-17 | Stop reason: HOSPADM

## 2024-07-15 RX ORDER — NALOXONE HCL 0.4 MG/ML
0.02 VIAL (ML) INJECTION
Status: DISCONTINUED | OUTPATIENT
Start: 2024-07-15 | End: 2024-07-17 | Stop reason: HOSPADM

## 2024-07-15 RX ORDER — POLYETHYLENE GLYCOL 3350 17 G/17G
17 POWDER, FOR SOLUTION ORAL DAILY PRN
Status: DISCONTINUED | OUTPATIENT
Start: 2024-07-15 | End: 2024-07-17 | Stop reason: HOSPADM

## 2024-07-15 RX ORDER — DONEPEZIL HYDROCHLORIDE 5 MG/1
10 TABLET, FILM COATED ORAL DAILY
Status: DISCONTINUED | OUTPATIENT
Start: 2024-07-16 | End: 2024-07-17 | Stop reason: HOSPADM

## 2024-07-15 RX ORDER — SODIUM CHLORIDE 0.9 % (FLUSH) 0.9 %
10 SYRINGE (ML) INJECTION EVERY 8 HOURS PRN
Status: DISCONTINUED | OUTPATIENT
Start: 2024-07-15 | End: 2024-07-17 | Stop reason: HOSPADM

## 2024-07-15 RX ADMIN — MEMANTINE 10 MG: 10 TABLET ORAL at 09:07

## 2024-07-15 RX ADMIN — RISPERIDONE 1 MG: 0.5 TABLET ORAL at 09:07

## 2024-07-15 RX ADMIN — FAMOTIDINE 20 MG: 20 TABLET ORAL at 09:07

## 2024-07-15 RX ADMIN — DIVALPROEX SODIUM 125 MG: 125 CAPSULE ORAL at 09:07

## 2024-07-15 RX ADMIN — CEFTRIAXONE 1 G: 1 INJECTION, POWDER, FOR SOLUTION INTRAMUSCULAR; INTRAVENOUS at 06:07

## 2024-07-15 RX ADMIN — ENOXAPARIN SODIUM 40 MG: 40 INJECTION SUBCUTANEOUS at 09:07

## 2024-07-15 RX ADMIN — SODIUM CHLORIDE 500 ML: 9 INJECTION, SOLUTION INTRAVENOUS at 05:07

## 2024-07-15 RX ADMIN — DEXTROSE AND SODIUM CHLORIDE: 5; 450 INJECTION, SOLUTION INTRAVENOUS at 09:07

## 2024-07-15 RX ADMIN — MELATONIN TAB 3 MG 3 MG: 3 TAB at 09:07

## 2024-07-15 NOTE — ED PROVIDER NOTES
SCRIBE #1 NOTE: I, Bouchra Merino, am scribing for, and in the presence of, Bryan Laird Jr., MD. I have scribed the HPI, ROS, and PE.     SCRIBE #2 NOTE: I, Remi Ramirez, am scribing for, and in the presence of,  Remi Ramirez. I have scribed the remaining portions of the note not scribed by Scribe #1.      History     Chief Complaint   Patient presents with    Hypotension     Hypotension at the nursing home, normaltensive in the ambulance 151/98. Possible syncopal episode per staff      Review of patient's allergies indicates:  No Known Allergies      History of Present Illness     HPI    7/15/2024, 2:41 PM  History obtained from the patient      History of Present Illness: Gabo Altamirano is a 78 y.o. male patient who presents to the Emergency Department for evaluation of a possible syncopal episode which onset gradually PTA. Per EMS, pt was hypotensive at nursing home; however, his BP diony to normal en route. He is not complaining of anything. Symptoms are constant and moderate in severity. No mitigating or exacerbating factors reported. Associated sxs include decreased BP. Patient denies any fever, congestion, SOB, CP, and all other sxs at this time. No prior Tx. No further complaints or concerns at this time.       Arrival mode: Ambulance Services    PCP: Maru, Primary Doctor        Past Medical History:  Past Medical History:   Diagnosis Date    Alzheimer's dementia     Anxiety disorder, unspecified     Bladder cancer     CKD (chronic kidney disease) stage 3, GFR 30-59 ml/min     Gout, unspecified     Hypertension     PTSD (post-traumatic stress disorder)        Past Surgical History:  Past Surgical History:   Procedure Laterality Date    COLONOSCOPY      ESOPHAGOGASTRODUODENOSCOPY      HAND SURGERY Right     JOINT REPLACEMENT           Family History:  Family History   Problem Relation Name Age of Onset    Cancer Mother      Cancer Father      Pancreatitis Father         Social History:  Social History      Tobacco Use    Smoking status: Never    Smokeless tobacco: Former   Substance and Sexual Activity    Alcohol use: Not Currently    Drug use: Not Currently    Sexual activity: Not on file        Review of Systems     Review of Systems   Constitutional:  Negative for fever.   HENT:  Negative for congestion and sore throat.    Respiratory:  Negative for shortness of breath.    Cardiovascular:  Negative for chest pain.        (+) lowered BP   Gastrointestinal:  Negative for abdominal pain and nausea.   Genitourinary:  Negative for dysuria.   Musculoskeletal:  Negative for back pain.   Skin:  Negative for rash.   Neurological:  Negative for weakness and headaches.   Hematological:  Does not bruise/bleed easily.   All other systems reviewed and are negative.       Physical Exam     Initial Vitals [07/15/24 1431]   BP Pulse Resp Temp SpO2   (!) 148/92 88 18 97.8 °F (36.6 °C) 97 %      MAP       --          Physical Exam  Nursing Notes and Vital Signs Reviewed.  Constitutional: Patient is in no acute distress. Well-developed and well-nourished.  Head: Atraumatic. Normocephalic.  Eyes:  EOM intact.  No scleral icterus.  ENT: Mucous membranes are moist.  Nares clear   Neck:  Full ROM. No JVD.  Cardiovascular: Regular rate. Regular rhythm No murmurs, rubs, or gallops. Distal pulses are 2+ and symmetric  Pulmonary/Chest: No respiratory distress. Clear to auscultation bilaterally. No wheezing or rales.  Equal chest wall rise bilaterally  Abdominal: Soft and non-distended.  There is no tenderness.  No rebound, guarding, or rigidity. Good bowel sounds.  Genitourinary: No CVA tenderness.  No suprapubic tenderness  Musculoskeletal: Moves all extremities. No obvious deformities.  5 x 5 strength in all extremities   Skin: Warm and dry.  Neurological:  Alert, awake, and appropriate.  Normal speech.  No acute focal neurological deficits are appreciated.  Two through 12 intact bilaterally.  Psychiatric: Normal affect. Good eye  "contact. Appropriate in content.       ED Course   Procedures  ED Vital Signs:  Vitals:    07/15/24 1431 07/15/24 1500 07/15/24 1510 07/15/24 1512   BP: (!) 148/92 (!) 114/56  109/65   Pulse: 88 80  73   Resp: 18 (!) 23  18   Temp: 97.8 °F (36.6 °C)      TempSrc: Oral      SpO2: 97% 99%  98%   Weight:   90.8 kg (200 lb 1.6 oz)    Height:        07/15/24 1514 07/15/24 1515 07/15/24 1709 07/15/24 1733   BP:  110/73 115/66 129/69   Pulse:  89 76 72   Resp: 20 18 18 17   Temp:       TempSrc:       SpO2:  98% 98% 96%   Weight:       Height:        07/15/24 1930 07/15/24 2000 07/15/24 2023 07/15/24 2325   BP: 118/66 123/65  139/65   Pulse: 81 70  70   Resp: 20 18  18   Temp:  97.5 °F (36.4 °C)  98.1 °F (36.7 °C)   TempSrc:  Oral  Oral   SpO2: 100% 96%  96%   Weight:   88 kg (194 lb 0.1 oz) 87.8 kg (193 lb 9 oz)   Height:   5' 10" (1.778 m)     07/16/24 0351   BP:    Pulse: 69   Resp:    Temp:    TempSrc:    SpO2:    Weight:    Height:        Abnormal Lab Results:  Labs Reviewed   CBC W/ AUTO DIFFERENTIAL - Abnormal; Notable for the following components:       Result Value    WBC 15.14 (*)     RBC 4.36 (*)     Hemoglobin 12.6 (*)     MCHC 31.5 (*)     Immature Granulocytes 0.7 (*)     Gran # (ANC) 12.2 (*)     Immature Grans (Abs) 0.11 (*)     Mono # 1.1 (*)     Gran % 80.8 (*)     Lymph % 9.8 (*)     All other components within normal limits   COMPREHENSIVE METABOLIC PANEL - Abnormal; Notable for the following components:    Glucose 129 (*)     Albumin 2.6 (*)     AST 51 (*)     ALT 63 (*)     All other components within normal limits   URINALYSIS, REFLEX TO URINE CULTURE - Abnormal; Notable for the following components:    Appearance, UA Hazy (*)     Protein, UA Trace (*)     Occult Blood UA Trace (*)     Nitrite, UA Positive (*)     Urobilinogen, UA 2.0-3.0 (*)     Leukocytes, UA 3+ (*)     All other components within normal limits    Narrative:     Specimen Source->Urine   URINALYSIS MICROSCOPIC - Abnormal; Notable for " the following components:    RBC, UA 7 (*)     WBC, UA >100 (*)     WBC Clumps, UA Many (*)     Bacteria Many (*)     Hyaline Casts, UA 3 (*)     All other components within normal limits    Narrative:     Specimen Source->Urine   CULTURE, URINE   TROPONIN I   B-TYPE NATRIURETIC PEPTIDE   LACTIC ACID, PLASMA        All Lab Results:  Results for orders placed or performed during the hospital encounter of 07/15/24   CBC auto differential   Result Value Ref Range    WBC 15.14 (H) 3.90 - 12.70 K/uL    RBC 4.36 (L) 4.60 - 6.20 M/uL    Hemoglobin 12.6 (L) 14.0 - 18.0 g/dL    Hematocrit 40.0 40.0 - 54.0 %    MCV 92 82 - 98 fL    MCH 28.9 27.0 - 31.0 pg    MCHC 31.5 (L) 32.0 - 36.0 g/dL    RDW 13.1 11.5 - 14.5 %    Platelets 417 150 - 450 K/uL    MPV 9.6 9.2 - 12.9 fL    Immature Granulocytes 0.7 (H) 0.0 - 0.5 %    Gran # (ANC) 12.2 (H) 1.8 - 7.7 K/uL    Immature Grans (Abs) 0.11 (H) 0.00 - 0.04 K/uL    Lymph # 1.5 1.0 - 4.8 K/uL    Mono # 1.1 (H) 0.3 - 1.0 K/uL    Eos # 0.1 0.0 - 0.5 K/uL    Baso # 0.06 0.00 - 0.20 K/uL    nRBC 0 0 /100 WBC    Gran % 80.8 (H) 38.0 - 73.0 %    Lymph % 9.8 (L) 18.0 - 48.0 %    Mono % 7.4 4.0 - 15.0 %    Eosinophil % 0.9 0.0 - 8.0 %    Basophil % 0.4 0.0 - 1.9 %    Differential Method Automated    Comprehensive metabolic panel   Result Value Ref Range    Sodium 140 136 - 145 mmol/L    Potassium 3.9 3.5 - 5.1 mmol/L    Chloride 106 95 - 110 mmol/L    CO2 23 23 - 29 mmol/L    Glucose 129 (H) 70 - 110 mg/dL    BUN 15 8 - 23 mg/dL    Creatinine 1.0 0.5 - 1.4 mg/dL    Calcium 8.8 8.7 - 10.5 mg/dL    Total Protein 7.2 6.0 - 8.4 g/dL    Albumin 2.6 (L) 3.5 - 5.2 g/dL    Total Bilirubin 0.5 0.1 - 1.0 mg/dL    Alkaline Phosphatase 98 55 - 135 U/L    AST 51 (H) 10 - 40 U/L    ALT 63 (H) 10 - 44 U/L    eGFR >60 >60 mL/min/1.73 m^2    Anion Gap 11 8 - 16 mmol/L   Troponin I   Result Value Ref Range    Troponin I 0.009 0.000 - 0.026 ng/mL   Brain natriuretic peptide   Result Value Ref Range    BNP 42 0 -  99 pg/mL   Urinalysis, Reflex to Urine Culture Urine, Clean Catch    Specimen: Urine, Clean Catch   Result Value Ref Range    Specimen UA Urine, Clean Catch     Color, UA Yellow Yellow, Straw, Yadira    Appearance, UA Hazy (A) Clear    pH, UA 6.0 5.0 - 8.0    Specific Gravity, UA 1.015 1.005 - 1.030    Protein, UA Trace (A) Negative    Glucose, UA Negative Negative    Ketones, UA Negative Negative    Bilirubin (UA) Negative Negative    Occult Blood UA Trace (A) Negative    Nitrite, UA Positive (A) Negative    Urobilinogen, UA 2.0-3.0 (A) <2.0 EU/dL    Leukocytes, UA 3+ (A) Negative   Urinalysis Microscopic   Result Value Ref Range    RBC, UA 7 (H) 0 - 4 /hpf    WBC, UA >100 (H) 0 - 5 /hpf    WBC Clumps, UA Many (A) None-Rare    Bacteria Many (A) None-Occ /hpf    Squam Epithel, UA 8 /hpf    Hyaline Casts, UA 3 (A) 0-1/lpf /lpf    Unclass Renuka UA Rare None-Moderate    Microscopic Comment SEE COMMENT    Lactic acid, plasma   Result Value Ref Range    Lactate (Lactic Acid) 1.3 0.5 - 2.2 mmol/L   Valproic Acid   Result Value Ref Range    Valproic Acid Level <12.5 (L) 50.0 - 100.0 ug/mL   Troponin I   Result Value Ref Range    Troponin I 0.010 0.000 - 0.026 ng/mL   Uric acid   Result Value Ref Range    Uric Acid 6.3 3.4 - 7.0 mg/dL   Troponin I   Result Value Ref Range    Troponin I 0.015 0.000 - 0.026 ng/mL   EKG 12-lead   Result Value Ref Range    QRS Duration 78 ms    [facility]HS QTC Calculation 437 ms         Imaging Results:  Imaging Results              CT Head Without Contrast (Final result)  Result time 07/15/24 16:26:37      Final result by Edvin Kc MD (07/15/24 16:26:37)                   Impression:      No acute abnormality.    Atrophy and chronic white matter changes    All CT scans   are performed using dose optimization techniques including the following: automated exposure control; adjustment of the mA and/or kV; use of iterative reconstruction technique.  Dose modulation was employed for ALARA by means of:  Automated exposure control; adjustment of the mA and/or kV according to patient size (this includes techniques or standardized protocols for targeted exams where dose is matched to indication/reason for exam; i.e. extremities or head); and/or use of iterative reconstructive technique.      Electronically signed by: Edvin Kc  Date:    07/15/2024  Time:    16:26               Narrative:    EXAMINATION:  CT HEAD WITHOUT CONTRAST    CLINICAL HISTORY:  Syncope, recurrent;    TECHNIQUE:  Low dose axial CT images obtained throughout the head without intravenous contrast. Sagittal and coronal reconstructions were performed.    COMPARISON:  None.    FINDINGS:  Atrophy and chronic white matter changes    Ventricles and sulci are normal in size for age without evidence of hydrocephalus. No extra-axial blood or fluid collections.    No parenchymal mass, hemorrhage, edema or major vascular distribution infarct.    Skull/extracranial contents (limited evaluation): No fracture. Mastoid air cells and paranasal sinuses are essentially clear.                                       X-Ray Chest AP Portable (Final result)  Result time 07/15/24 16:31:51      Final result by Edvin Kc MD (07/15/24 16:31:51)                   Impression:      No acute abnormality.  Stable exam      Electronically signed by: Edvin Kc  Date:    07/15/2024  Time:    16:31               Narrative:    EXAMINATION:  XR CHEST AP PORTABLE    CLINICAL HISTORY:  syncope;    TECHNIQUE:  Single frontal view of the chest was performed.    COMPARISON:  Prior    FINDINGS:  Prominent cardiac silhouette.  Low lung volumes.  Isthe lungs are clear, with normal appearance of pulmonary vasculature and no pleural effusion or pneumothorax.    Mild atherosclerotic changes of the aorta    Bones are intact.                                       The EKG was ordered, reviewed, and independently interpreted by the ED provider.  Interpretation time: 15:03  Rate: 84  BPM  Rhythm: normal sinus rhythm  Interpretation: Junctional ST depression, probably normal. No STEMI.             The Emergency Provider reviewed the vital signs and test results, which are outlined above.     ED Discussion     4:00 PM: Dr. Laird transfers care of patient to Dr. Boswell pending lab results.    6:36 PM - CONSULT: Dr. Boswell discussed the case with Dr. Andrade ( [facility]). Agrees with current management.  Admitting Service: Inpatient   Admitting Physician: Dr. Andrade   Admit to Obs Tele         Medical Decision Making  DDx includes: Arrythmia, valvular heart disease, ACS, HF, Dehydration, Electrolyte abnormality,  Sepsis, UTI, ICH, CVA    79 y/o M with PMH of Dementia, CKD here with c/o AMS. It appears he may have had a syncopal episode at nursing home, and was hypotensive. With EMS, bp was improved. Here, he has a UTI, and WBC of 15k. Son is at bedside    Amount and/or Complexity of Data Reviewed  Independent Historian: caregiver     Details: Patient has dementia, cannot provide history.   Labs: ordered. Decision-making details documented in ED Course.  Radiology: ordered. Decision-making details documented in ED Course.  ECG/medicine tests: ordered and independent interpretation performed. Decision-making details documented in ED Course.                ED Medication(s):  Medications   dextrose 5 % and 0.45 % NaCl infusion ( Intravenous New Bag 7/15/24 2123)   cefTRIAXone (Rocephin) 1 g in D5W 100 mL IVPB (MB+) (has no administration in time range)   aspirin chewable tablet 81 mg (has no administration in time range)   divalproex capsule 125 mg (125 mg Oral Given 7/15/24 2109)   sodium chloride 0.9% flush 10 mL (has no administration in time range)   albuterol-ipratropium 2.5 mg-0.5 mg/3 mL nebulizer solution 3 mL (has no administration in time range)   ondansetron injection 4 mg (has no administration in time range)   polyethylene glycol packet 17 g (has no administration in time range)    acetaminophen tablet 650 mg (has no administration in time range)   naloxone 0.4 mg/mL injection 0.02 mg (has no administration in time range)   glucose chewable tablet 16 g (has no administration in time range)   glucose chewable tablet 24 g (has no administration in time range)   glucagon (human recombinant) injection 1 mg (has no administration in time range)   enoxaparin injection 40 mg (40 mg Subcutaneous Given 7/15/24 2110)   dextrose 10% bolus 125 mL 125 mL (has no administration in time range)   dextrose 10% bolus 250 mL 250 mL (has no administration in time range)   donepeziL tablet 10 mg (has no administration in time range)   famotidine tablet 20 mg (20 mg Oral Given 7/15/24 2109)   lactulose 20 gram/30 mL solution Soln 20 g (has no administration in time range)   melatonin tablet 3 mg (3 mg Oral Given 7/15/24 2110)   memantine tablet 10 mg (10 mg Oral Given 7/15/24 2109)   risperiDONE tablet 0.5 mg (has no administration in time range)   senna tablet 2 tablet (has no administration in time range)   risperiDONE tablet 1 mg (1 mg Oral Given 7/15/24 2108)   hydrALAZINE injection 10 mg (has no administration in time range)   sodium chloride 0.9% bolus 500 mL 500 mL (0 mLs Intravenous Stopped 7/15/24 1847)   cefTRIAXone (Rocephin) 1 g in D5W 100 mL IVPB (MB+) (0 g Intravenous Stopped 7/15/24 1916)       Current Discharge Medication List                  Scribe Attestation:   Scribe #1: I performed the above scribed service and the documentation accurately describes the services I performed. I attest to the accuracy of the note.     Attending:   Physician Attestation Statement for Scribe #1: I, Bryan Laird Jr., MD, personally performed the services described in this documentation, as scribed by Bouchra Merino, in my presence, and it is both accurate and complete.       Scribe Attestation:   Scribe #2: I performed the above scribed service and the documentation accurately describes the services I  performed. I attest to the accuracy of the note.    Attending Attestation:           Physician Attestation for Scribe:    Physician Attestation Statement for Scribe #2: I, Remi Ramirez, reviewed documentation, as scribed by Ollie Boswell MD in my presence, and it is both accurate and complete. I also acknowledge and confirm the content of the note done by Scribe #1.           Clinical Impression       ICD-10-CM ICD-9-CM   1. Urinary tract infection without hematuria, site unspecified  N39.0 599.0   2. Syncope  R55 780.2   3. Chest pain  R07.9 786.50       Disposition:   Disposition: Placed in Observation  Condition: Stable         Ollie Boswell MD  07/16/24 0422       Ollie Boswell MD  07/16/24 0422

## 2024-07-15 NOTE — Clinical Note
Diagnosis: Syncope [206001]   Future Attending Provider: JAMES KATHLEEN [[id number]]   Special Needs:: No Special Needs [1]

## 2024-07-16 LAB
ALBUMIN SERPL BCP-MCNC: 2.2 G/DL (ref 3.5–5.2)
ALP SERPL-CCNC: 78 U/L (ref 55–135)
ALT SERPL W/O P-5'-P-CCNC: 41 U/L (ref 10–44)
ANION GAP SERPL CALC-SCNC: 9 MMOL/L (ref 8–16)
AST SERPL-CCNC: 29 U/L (ref 10–40)
BASOPHILS # BLD AUTO: 0.1 K/UL (ref 0–0.2)
BASOPHILS NFR BLD: 0.9 % (ref 0–1.9)
BILIRUB SERPL-MCNC: 0.6 MG/DL (ref 0.1–1)
BUN SERPL-MCNC: 10 MG/DL (ref 8–23)
CALCIUM SERPL-MCNC: 8.6 MG/DL (ref 8.7–10.5)
CHLORIDE SERPL-SCNC: 109 MMOL/L (ref 95–110)
CO2 SERPL-SCNC: 23 MMOL/L (ref 23–29)
CREAT SERPL-MCNC: 0.7 MG/DL (ref 0.5–1.4)
DIFFERENTIAL METHOD BLD: ABNORMAL
EOSINOPHIL # BLD AUTO: 0.2 K/UL (ref 0–0.5)
EOSINOPHIL NFR BLD: 1.8 % (ref 0–8)
ERYTHROCYTE [DISTWIDTH] IN BLOOD BY AUTOMATED COUNT: 13.2 % (ref 11.5–14.5)
EST. GFR  (NO RACE VARIABLE): >60 ML/MIN/1.73 M^2
GLUCOSE SERPL-MCNC: 118 MG/DL (ref 70–110)
HCT VFR BLD AUTO: 36.8 % (ref 40–54)
HGB BLD-MCNC: 11.6 G/DL (ref 14–18)
IMM GRANULOCYTES # BLD AUTO: 0.07 K/UL (ref 0–0.04)
IMM GRANULOCYTES NFR BLD AUTO: 0.6 % (ref 0–0.5)
LYMPHOCYTES # BLD AUTO: 2.4 K/UL (ref 1–4.8)
LYMPHOCYTES NFR BLD: 20.9 % (ref 18–48)
MAGNESIUM SERPL-MCNC: 2.1 MG/DL (ref 1.6–2.6)
MCH RBC QN AUTO: 28.6 PG (ref 27–31)
MCHC RBC AUTO-ENTMCNC: 31.5 G/DL (ref 32–36)
MCV RBC AUTO: 91 FL (ref 82–98)
MONOCYTES # BLD AUTO: 0.8 K/UL (ref 0.3–1)
MONOCYTES NFR BLD: 7.3 % (ref 4–15)
NEUTROPHILS # BLD AUTO: 7.9 K/UL (ref 1.8–7.7)
NEUTROPHILS NFR BLD: 68.5 % (ref 38–73)
NRBC BLD-RTO: 0 /100 WBC
PLATELET # BLD AUTO: 366 K/UL (ref 150–450)
PMV BLD AUTO: 9.5 FL (ref 9.2–12.9)
POTASSIUM SERPL-SCNC: 3.7 MMOL/L (ref 3.5–5.1)
PROT SERPL-MCNC: 6.2 G/DL (ref 6–8.4)
RBC # BLD AUTO: 4.05 M/UL (ref 4.6–6.2)
SODIUM SERPL-SCNC: 141 MMOL/L (ref 136–145)
TROPONIN I SERPL DL<=0.01 NG/ML-MCNC: 0.01 NG/ML (ref 0–0.03)
WBC # BLD AUTO: 11.52 K/UL (ref 3.9–12.7)

## 2024-07-16 PROCEDURE — 85025 COMPLETE CBC W/AUTO DIFF WBC: CPT | Performed by: NURSE PRACTITIONER

## 2024-07-16 PROCEDURE — S5010 5% DEXTROSE AND 0.45% SALINE: HCPCS | Performed by: NURSE PRACTITIONER

## 2024-07-16 PROCEDURE — 80053 COMPREHEN METABOLIC PANEL: CPT | Performed by: NURSE PRACTITIONER

## 2024-07-16 PROCEDURE — 97530 THERAPEUTIC ACTIVITIES: CPT

## 2024-07-16 PROCEDURE — 97162 PT EVAL MOD COMPLEX 30 MIN: CPT

## 2024-07-16 PROCEDURE — 83735 ASSAY OF MAGNESIUM: CPT | Performed by: NURSE PRACTITIONER

## 2024-07-16 PROCEDURE — 11000001 HC ACUTE MED/SURG PRIVATE ROOM

## 2024-07-16 PROCEDURE — 96361 HYDRATE IV INFUSION ADD-ON: CPT

## 2024-07-16 PROCEDURE — 97166 OT EVAL MOD COMPLEX 45 MIN: CPT

## 2024-07-16 PROCEDURE — 36415 COLL VENOUS BLD VENIPUNCTURE: CPT | Performed by: NURSE PRACTITIONER

## 2024-07-16 PROCEDURE — 25000003 PHARM REV CODE 250: Performed by: NURSE PRACTITIONER

## 2024-07-16 PROCEDURE — 84484 ASSAY OF TROPONIN QUANT: CPT | Performed by: NURSE PRACTITIONER

## 2024-07-16 PROCEDURE — 63600175 PHARM REV CODE 636 W HCPCS: Performed by: NURSE PRACTITIONER

## 2024-07-16 RX ORDER — SODIUM CHLORIDE 9 MG/ML
INJECTION, SOLUTION INTRAVENOUS
Status: DISCONTINUED | OUTPATIENT
Start: 2024-07-16 | End: 2024-07-17 | Stop reason: HOSPADM

## 2024-07-16 RX ADMIN — FAMOTIDINE 20 MG: 20 TABLET ORAL at 08:07

## 2024-07-16 RX ADMIN — RISPERIDONE 0.5 MG: 0.5 TABLET ORAL at 09:07

## 2024-07-16 RX ADMIN — SENNOSIDES 2 TABLET: 8.6 TABLET, FILM COATED ORAL at 09:07

## 2024-07-16 RX ADMIN — MEMANTINE 10 MG: 10 TABLET ORAL at 08:07

## 2024-07-16 RX ADMIN — DIVALPROEX SODIUM 125 MG: 125 CAPSULE ORAL at 08:07

## 2024-07-16 RX ADMIN — MEMANTINE 10 MG: 10 TABLET ORAL at 09:07

## 2024-07-16 RX ADMIN — MELATONIN TAB 3 MG 3 MG: 3 TAB at 08:07

## 2024-07-16 RX ADMIN — DONEPEZIL HYDROCHLORIDE 10 MG: 5 TABLET, FILM COATED ORAL at 09:07

## 2024-07-16 RX ADMIN — LACTULOSE 20 G: 20 SOLUTION ORAL at 09:07

## 2024-07-16 RX ADMIN — FAMOTIDINE 20 MG: 20 TABLET ORAL at 09:07

## 2024-07-16 RX ADMIN — RISPERIDONE 1 MG: 0.5 TABLET ORAL at 08:07

## 2024-07-16 RX ADMIN — ENOXAPARIN SODIUM 40 MG: 40 INJECTION SUBCUTANEOUS at 05:07

## 2024-07-16 RX ADMIN — ASPIRIN 81 MG CHEWABLE TABLET 81 MG: 81 TABLET CHEWABLE at 09:07

## 2024-07-16 RX ADMIN — DEXTROSE AND SODIUM CHLORIDE: 5; 450 INJECTION, SOLUTION INTRAVENOUS at 05:07

## 2024-07-16 RX ADMIN — CEFTRIAXONE 1 G: 1 INJECTION, POWDER, FOR SOLUTION INTRAMUSCULAR; INTRAVENOUS at 05:07

## 2024-07-16 RX ADMIN — DIVALPROEX SODIUM 125 MG: 125 CAPSULE ORAL at 09:07

## 2024-07-16 NOTE — PT/OT/SLP EVAL
Occupational Therapy   Evaluation and Discharge Note    Name: Gabo Altamirano  MRN: [id number]  Admitting Diagnosis: Acute cystitis with hematuria  Recent Surgery: * No surgery found *      Recommendations:     Discharge Recommendations: No Therapy Indicated (return to NH)  Discharge Equipment Recommendations: none  Barriers to discharge:  None    Assessment:     Gabo Altamirano is a 78 y.o. male with a medical diagnosis of Acute cystitis with hematuria. At this time, patient requires Total care and is unable to follow commands to participate in therapy services d/t progressing dementia diagnosis. Pt does not require further acute OT services.     Plan:   D/C acute OT services.  During this hospitalization, patient does not require further acute OT services.  Please re-consult if situation changes.    Plan of Care Reviewed with: patient, family    Subjective     Chief Complaint: pt grimacing with movement  Patient/Family Comments/goals: get better    Occupational Profile: Pt poor historian; family present in room to provide pt history/PLOF.  Living Environment: resident at [facility].  Previous level of function: Requires assist with all ADLs. Pt has been wearing diapers for toileting for the past ~2-3 months. Pt dependent with all mobility, using w/c for functional mobility and lift device for t/fs. Family in room reports a decline in mobility over the past 3 months. Prior to 3 months ago, pt was ambulating short household distances and performed stand pivot t/fs with assist to wheelchair.   Roles and Routines: does not drive  Equipment Used at home: wheelchair, lift device, hospital bed, shower chair, other (see comments) (NH equipment)  Assistance upon Discharge: NH staff/family    Pain/Comfort:  Pain Rating 1: 0/10    Objective:     Communicated with: Nurse and epic chart review prior to session.  Patient found right sidelying with peripheral IV, telemetry, PureWick, bed alarm, pressure  relief boots upon OT entry to room.    General Precautions: Standard, fall, other (see comments) (dementia)  Orthopedic Precautions: N/A  Braces: N/A  Respiratory Status: Room air     Occupational Performance:    Bed Mobility:    Patient completed Rolling/Turning to Left with  total assistance  Patient completed Rolling/Turning to Right with total assistance  Patient completed Supine to Sit with total assistance  Patient completed Sit to Supine with total assistance  Forward scoot to EOB with Total A.  Supine scoot to HOB with Total A x2.    Functional Mobility/Transfers:  Unable to perform at this time d/t safety concerns.    Activities of Daily Living:  Lower Body Dressing: dependence doff/dana pressure relief boots in bed    Cognitive/Visual Perceptual:  Cognitive/Psychosocial Skills:     -       Oriented to: Person   -       Follows Commands/attention:UNABLE TO FOLLOW COMMANDS DESPITE CONSISTENT CUEING  -       Communication: clear/fluent  -       Memory: Impaired STM, Impaired LTM, and Poor immediate recall  -       Safety awareness/insight to disability: impaired   Pt with Alzheimer's dementia.    Physical Exam:  Sensation:    -       Intact  Upper Extremity Range of Motion:     -       Right Upper Extremity: AAROM WFL  -       Left Upper Extremity: AAROM WFL  Upper Extremity Strength:    -       Right Upper Extremity: unable to assess d/t pt cognition  -       Left Upper Extremity: unable to assess d/t pt cognition   Strength:    -       Right Upper Extremity: unable to assess d/t pt cognition  -       Left Upper Extremity: unable to assess d/t pt cognition    AMPAC 6 Click ADL:  AMPAC Total Score: 8    Treatment & Education:  Patient and family educated on role of OT in acute setting and benefits of participation. Educated on importance of EOB/OOB activity and calling for A to transfer and meet needs. Encouraged completion of B UE AAROM therex throughout the day to tolerance to increase functional  strength and activity tolerance. Educated pt and family on frequent turning/repositioning in bed for pressure relief to prevent skin breakdown/injury.  Patient's family stated understanding and in agreement with POC. Unsure of pt's level of understanding d/t cognitive status.    Patient left left sidelying with wedge with all lines intact, call button in reach, bed alarm on, and family present    GOALS:   Multidisciplinary Problems       Occupational Therapy Goals       Not on file                    History:     Past Medical History:   Diagnosis Date    Alzheimer's dementia     Anxiety disorder, unspecified     Bladder cancer     CKD (chronic kidney disease) stage 3, GFR 30-59 ml/min     Gout, unspecified     Hypertension     PTSD (post-traumatic stress disorder)          Past Surgical History:   Procedure Laterality Date    COLONOSCOPY      ESOPHAGOGASTRODUODENOSCOPY      HAND SURGERY Right     JOINT REPLACEMENT         Time Tracking:     OT Date of Treatment: 07/16/24  OT Start Time: 1415  OT Stop Time: 1440  OT Total Time (min): 25 min    Billable Minutes:Evaluation 15  Therapeutic Activity 10    7/16/2024  Jessica Anthony, OT

## 2024-07-16 NOTE — ASSESSMENT & PLAN NOTE
Chronic, controlled. Latest blood pressure and vitals reviewed-     Temp:  [97.5 °F (36.4 °C)-97.8 °F (36.6 °C)]   Pulse:  [70-89]   Resp:  [17-23]   BP: (109-148)/(56-92)   SpO2:  [96 %-100 %] .   Home meds for hypertension were reviewed and noted below.   Hypertension Medications               amLODIPine (NORVASC) 10 MG tablet Take 10 mg by mouth once daily.    furosemide (LASIX) 20 MG tablet Take 20 mg by mouth daily as needed.            While in the hospital, will manage blood pressure as follows; Holding home medications for now    Will utilize p.r.n. blood pressure medication only if patient's blood pressure greater than 180/110 and he develops symptoms such as worsening chest pain or shortness of breath.

## 2024-07-16 NOTE — PLAN OF CARE
OT eval completed. Recommends return to NH.  Pt total A for all mobility and tasks. Pt unable to follow commands to participate in therapy d/t progressing dementia.   D/C acute OT services.

## 2024-07-16 NOTE — ASSESSMENT & PLAN NOTE
Creatine stable for now. BMP reviewed- noted Estimated Creatinine Clearance: 97.1 mL/min (based on SCr of 0.7 mg/dL). according to latest data. Based on current GFR, CKD stage is stage 3 - GFR 30-59.  Monitor UOP and serial BMP and adjust therapy as needed. Renally dose meds. Avoid nephrotoxic medications and procedures.

## 2024-07-16 NOTE — PLAN OF CARE
Plan of care discussed with pt's son. Understanding verbalized. Pt free from injury. No s/s of distress noted. Vitals stable. IV infusing. Meds as ordered. No c/o pain. Diet tolerated. Tele monitor in place. Q2 hour rounding. No complaints. Will continue to monitor pt. 12 hour chart review.

## 2024-07-16 NOTE — PLAN OF CARE
PT EVAL complete. Required total A of 2 for bed mobility. Pt unable to follow commands. At this time, patient is functioning at their prior level of function and does not require further acute PT services.  Please re-consult if situation changes.

## 2024-07-16 NOTE — PT/OT/SLP PROGRESS
Physical Therapy      Patient Name:  Gabo Altamirano   MRN:  [id number]    PT orders received, EVAL initiated via chart review. Patient not seen today secondary to Bowel/bladder accident. Will continue efforts.    Gabriela Conway, PT, DPT  7/16/2024   1059

## 2024-07-16 NOTE — PLAN OF CARE
[facility] - Med Surg 3  Initial Discharge Assessment       Primary Care Provider: No, Primary Doctor    Admission Diagnosis: Syncope [R55]  Urinary tract infection without hematuria, site unspecified [N39.0]    Admission Date: 7/15/2024  Expected Discharge Date:     Transition of Care Barriers: (P) None    [facility] / Plan: [employer] [facility] MCARE ADV / Product Type: Medicare Advantage /     Extended Emergency Contact Information  Primary Emergency Contact: Angelo Washington  Mobile Phone: [phone]  Relation: Son   needed? No  Secondary Emergency Contact: AMPARO WASHINGTON  Mobile Phone: [phone]  Relation: Son   needed? No    Discharge Plan A: (P) Return to nursing home       No Pharmacies Listed    Initial Assessment (most recent)       Adult Discharge Assessment - 07/16/24 1656          Discharge Assessment    Assessment Type Discharge Planning Assessment (P)      Confirmed/corrected address, phone number and insurance Yes (P)      Confirmed Demographics Correct on Facesheet (P)      Source of Information patient (P)      Communicated BRODIE with patient/caregiver Date not available/Unable to determine (P)      Reason For Admission UTI (P)      People in Home facility resident (P)      Facility Arrived From: [facility] (P)      Do you expect to return to your current living situation? Yes (P)      Do you have help at home or someone to help you manage your care at home? Yes (P)      Who are your caregiver(s) and their phone number(s)? facility caregivers (P)      Prior to hospitilization cognitive status: Unable to Assess (P)      Current cognitive status: Not Oriented to Time;Not Oriented to Place;Not Oriented to Person (P)      Walking or Climbing Stairs Difficulty yes (P)      Walking or Climbing Stairs transferring difficulty, requires equipment;stair climbing difficulty, dependent;ambulation difficulty, dependent;transferring difficulty, dependent (P)       Mobility Management w/c bound (P)      Dressing/Bathing Difficulty yes (P)      Dressing/Bathing bathing difficulty, dependent;dressing difficulty, dependent (P)      Equipment Currently Used at Home wheelchair;hospital bed (P)      Readmission within 30 days? No (P)      Patient currently being followed by outpatient case management? No (P)      Do you currently have service(s) that help you manage your care at home? No (P)      Do you take prescription medications? Yes (P)      Do you have prescription coverage? Yes (P)      Coverage [employer] (P)      Do you have any problems affording any of your prescribed medications? No (P)      Is the patient taking medications as prescribed? yes (P)      Who is going to help you get home at discharge? facility transport (P)      How do you get to doctors appointments? other (see comments) (P)    facility transport    Are you on dialysis? No (P)      Do you take coumadin? No (P)      Discharge Plan A Return to nursing home (P)      DME Needed Upon Discharge  none (P)      Discharge Plan discussed with: Adult children (P)      Transition of Care Barriers None (P)                    Discharge plan is to return to [facility].  Patient is resident.

## 2024-07-16 NOTE — PLAN OF CARE
Inpatient Upgrade Note    Gabo Altamirano has warranted treatment spanning two or more midnights of hospital level care for the management of  UTI, increased confusion, THAO, and initially very hypotensive . He continues to require IV antibiotics, IV fluids, daily labs, further testing/imaging, monitoring of vital signs, and Wound Care Consult & Telemetry . His condition is also complicated by the following comorbidities: Hypertension, Chronic kidney disease, and Alzheimers Dementia, Anxiety Disorder, Bladder Cancer, Gout, and PTSD .

## 2024-07-16 NOTE — PT/OT/SLP EVAL
Physical Therapy Evaluation and Discharge Note    Patient Name:  Gabo Altamirano   MRN:  [id number]    Recommendations:     Discharge Recommendations: No Therapy Indicated  Discharge Equipment Recommendations: none   Barriers to discharge: None    Assessment:     Gabo Altamirano is a 78 y.o. male admitted with a medical diagnosis of Acute cystitis with hematuria. .  At this time, patient is functioning at their prior level of function and does not require further acute PT services. Pt with a poor therapy prognosis due to inability to follow commands and progressing hx of dementia.    Recent Surgery: * No surgery found *      Plan:     During this hospitalization, patient does not require further acute PT services.  Please re-consult if situation changes.      Subjective     Chief Complaint: Pt agreed to participate  Patient/Family Comments/goals: none stated  Pain/Comfort:  Pain Rating 1: 0/10 (reported at rest)  Location - Side 1: Bilateral  Location - Orientation 1: generalized  Location 1: leg  Pain Addressed 1: Distraction, Cessation of Activity, Reposition  Pain Rating Post-Intervention 1:  (facial grimace with movement)    Patients cultural, spiritual, Advent conflicts given the current situation: no    Living Environment:  Hx obtained from family in room. Unable to obtain hx from pt due to impaired cognition.   Pt is a NH resident at [facility].   Prior to admission, patients level of function was dependent with all mobility utilizing a lift device for transfers. Family in room reports a decline in mobility over the past 3 months. Prior to 3 months ago, pt was ambulating short household distances. Equipment used at home: shower chair, lift device, wheelchair (NH equipment).  DME owned (not currently used): none.  Upon discharge, patient will have assistance from FACILITY STAFF.    Objective:     Communicated with nurse Benavidez and epic chart review prior to session.  Patient found right sidelying  "with peripheral IV, pressure relief boots, bed alarm, PureWick, telemetry upon PT entry to room.    General Precautions: Standard, fall (hx of dementia)    Orthopedic Precautions:N/A   Braces: N/A  Respiratory Status: Room air    Exams:  Cognitive Exam:  Patient is oriented to Person and not oriented to time, place, or situation. Pt with hx of dementia, unable to follow commands.   Postural Exam:  Patient presented with the following abnormalities:    -       Rounded shoulders  -       Forward head  -       Kyphosis  Sensation:    Skin Integrity/Edema:      -       Skin integrity: Impaired skin integrity on B heels and sacrum  RLE ROM: Grossly Limited  RLE Strength: unable to assess due to impaired cognition, unable to follow commands  LLE ROM: Grossly Limited  LLE Strength: unable to assess due to impaired cognition, unable to follow commands    Functional Mobility:  Gait belt applied - N/A  Bed Mobility  Rolling Left: total assistance and of 2 persons  Rolling Right: total assistance and of 2 persons  Scooting: total assistance and of 2 persons  Supine to Sit: total assistance and of 2 persons for LE management and trunk management  Sit to Supine: total assistance and of 2 persons for LE management and trunk management  Increased time needed due to verbal cuing.   Balance  Sitting: maximal assistance  Sat EOB x8min Unable to progress transfers and gait, pt transferring via lift device at NH. Pt unable to follow commands throughout tx.    AM-PAC 6 CLICK MOBILITY  Total Score:6       Treatment and Education:  Pt educated on role of PT in acute care. Educated on importance of OOB activities, activity pacing, and HEP (marching/hip flex, hip abd, heel slides/LAQ, quad sets, ankle pumps) in order to maintain/regain strength. Encouraged to sit up for all meals. Educated on position changes for pressure relief and use of pressure relief boots. Educated on "call don't fall" policy and increased risk of falling due to " weakness, instructed to utilize call bell for assistance with all transfers. Pt agreeable to all requests.      AM-PAC 6 CLICK MOBILITY  Total Score:6     Patient left left sidelying with all lines intact, call button in reach, bed alarm on, and family present.    GOALS:   Multidisciplinary Problems       Physical Therapy Goals       Not on file                    History:     Past Medical History:   Diagnosis Date    Alzheimer's dementia     Anxiety disorder, unspecified     Bladder cancer     CKD (chronic kidney disease) stage 3, GFR 30-59 ml/min     Gout, unspecified     Hypertension     PTSD (post-traumatic stress disorder)        Past Surgical History:   Procedure Laterality Date    COLONOSCOPY      ESOPHAGOGASTRODUODENOSCOPY      HAND SURGERY Right     JOINT REPLACEMENT         Time Tracking:     PT Received On: 07/16/24  PT Start Time: 1410     PT Stop Time: 1435  PT Total Time (min): 25 min     Billable Minutes: Evaluation 15min and Therapeutic Activity 10min      07/16/2024

## 2024-07-16 NOTE — HOSPITAL COURSE
79 y/o male presented to the ER from NH after syncope versus near-syncope with hypotension 68/54.   UA positive nitrite, 3+ leukocyte esterase, greater than 100 WBC, many bacteria   CT head without contrast was done with no acute abnormality, shows atrophy and chronic white matter changes.   Chest x-ray shows no acute abnormality, prominent cardiac silhouette low lung volumes noted, lungs are clear.   IV abx- Rocephin 1g   BC pending   Urine culture pending  Wound care consulted  PT/OT to eval and treat     Patient denies any complaints and states he is feeling well. Son at bedside.  Leukocytosis improved, h/h stable, and no electrolyte abnormalities noted in morning labs. Patient to be discharged back to nursing home. Will need follow up with PCP at nursing home. Patient discharged with prescription for omnicef BID for 7 days. Patient advised to hold amlodipine and lasix until follow up with PCP.   Patient seen and examined on the day of discharge.  All questions and concerns were addressed prior to discharge.    Face to face encounter with patient: 48 min

## 2024-07-16 NOTE — SUBJECTIVE & OBJECTIVE
Interval History: f/u hypotension. Patient awake and alert, only oriented to self. ROS difficult due to patient's Alzheimer's dementia. Son at bedside, states father was even more confused yesterday and was very out of it yesterday. Patient denies any complaints at this time. Will continue IV abx, follow urine and blood cultures. Pt/ot to eval and treat.     Review of Systems   Reason unable to perform ROS: ROS limited due to Patients Alzheimers Dementia. Son aided in ROS.   Constitutional:  Positive for appetite change (weight loss).   Cardiovascular:  Positive for leg swelling. Negative for chest pain.   Gastrointestinal: Negative.  Negative for abdominal pain, nausea and vomiting.   Genitourinary:  Positive for dysuria.   Musculoskeletal:  Positive for arthralgias and myalgias.        Pain to right shoulder (chronic), followed by Dr. Joseph outpt    Neurological:  Positive for weakness (generalized).     Objective:     Vital Signs (Most Recent):  Temp: 98.5 °F (36.9 °C) (07/16/24 0728)  Pulse: 78 (07/16/24 0900)  Resp: 18 (07/16/24 0728)  BP: (!) 140/65 (07/16/24 0728)  SpO2: 97 % (07/16/24 0728) Vital Signs (24h Range):  Temp:  [97.5 °F (36.4 °C)-98.5 °F (36.9 °C)] 98.5 °F (36.9 °C)  Pulse:  [68-89] 78  Resp:  [17-23] 18  SpO2:  [96 %-100 %] 97 %  BP: (109-148)/(56-92) 140/65     Weight: 87.8 kg (193 lb 9 oz)  Body mass index is 27.77 kg/m².    Intake/Output Summary (Last 24 hours) at 7/16/2024 1055  Last data filed at 7/16/2024 0855  Gross per 24 hour   Intake 1965.07 ml   Output --   Net 1965.07 ml         Physical Exam  Vitals and nursing note reviewed.   Constitutional:       General: He is not in acute distress.     Appearance: He is ill-appearing (chronically).   HENT:      Mouth/Throat:      Mouth: Mucous membranes are dry.      Pharynx: Oropharynx is clear.   Eyes:      Pupils: Pupils are equal, round, and reactive to light.   Cardiovascular:      Rate and Rhythm: Normal rate and regular rhythm.       Heart sounds: No murmur heard.  Pulmonary:      Effort: Pulmonary effort is normal.      Breath sounds: Normal breath sounds. No wheezing.   Abdominal:      General: Bowel sounds are normal. There is no distension.      Palpations: Abdomen is soft.      Tenderness: There is no abdominal tenderness.   Musculoskeletal:         General: Signs of injury (s/p fall a few months ago to right shoulder-limited ROM. Followed by Dr. Joseph) present. Normal range of motion.      Right lower leg: Edema (+1) present.      Left lower leg: Edema (+1) present.   Skin:     General: Skin is warm and dry.   Neurological:      General: No focal deficit present.      Mental Status: He is alert and oriented to person, place, and time.      Motor: Weakness (generalized) present.             Significant Labs: All pertinent labs within the past 24 hours have been reviewed.  Recent Lab Results  (Last 5 results in the past 24 hours)        07/16/24  0610   07/16/24  0025   07/15/24  2018   07/15/24  1846   07/15/24  1845        Albumin 2.2               ALP 78               ALT 41               Anion Gap 9               AST 29               Baso # 0.10               Basophil % 0.9               BILIRUBIN TOTAL 0.6  Comment: For infants and newborns, interpretation of results should be based  on gestational age, weight and in agreement with clinical  observations.    Premature Infant recommended reference ranges:  Up to 24 hours.............<8.0 mg/dL  Up to 48 hours............<12.0 mg/dL  3-5 days..................<15.0 mg/dL  6-29 days.................<15.0 mg/dL                 Blood Culture, Routine         No Growth to date  [P]                No Growth to date  [P]       BUN 10               Calcium 8.6               Chloride 109               CO2 23               Creatinine 0.7               Differential Method Automated               eGFR >60               Eos # 0.2               Eos % 1.8               Glucose 118               Gran  # (ANC) 7.9               Gran % 68.5               Hematocrit 36.8               Hemoglobin 11.6               Immature Grans (Abs) 0.07  Comment: Mild elevation in immature granulocytes is non specific and   can be seen in a variety of conditions including stress response,   acute inflammation, trauma and pregnancy. Correlation with other   laboratory and clinical findings is essential.                 Immature Granulocytes 0.6               Lactic Acid Level       1.3  Comment: Falsely low lactic acid results can be found in samples   containing >=13.0 mg/dL total bilirubin and/or >=3.5 mg/dL   direct bilirubin.           Lymph # 2.4               Lymph % 20.9               Magnesium  2.1               MCH 28.6               MCHC 31.5               MCV 91               Mono # 0.8               Mono % 7.3               MPV 9.5               nRBC 0               Platelet Count 366               Potassium 3.7               PROTEIN TOTAL 6.2               RBC 4.05               RDW 13.2               Sodium 141               Troponin I   0.015  Comment: The reference interval for Troponin I represents the 99th percentile   cutoff   for our facility and is consistent with 3rd generation assay   performance.     0.010  Comment: The reference interval for Troponin I represents the 99th percentile   cutoff   for our facility and is consistent with 3rd generation assay   performance.             Uric Acid     6.3           Valproic Acid Lvl     <12.5  Comment: Valproic Acid (ug/ml)  Toxic:   >100.0 ug/ml             WBC 11.52                                       [P] - Preliminary Result               Significant Imaging: I have reviewed all pertinent imaging results/findings within the past 24 hours.

## 2024-07-16 NOTE — PROGRESS NOTES
[facility]  [facility] Medicine  Progress Note    Patient Name: Gabo Altamirano  MRN: [id number]  Patient Class: OP- Observation   Admission Date: 7/15/2024  Length of Stay: 0 days  Attending Physician: Jacoby Ceballos MD  Primary Care Provider: Maru, Primary Doctor        Subjective:     Principal Problem:Acute cystitis with hematuria        HPI:  Patient is 78-year-old male with past medical history significant for Alzheimer's dementia, PTSD, generalized anxiety disorder, hypertension, CKD 3A, bladder cancer, and gout who presented from nursing home after syncope versus near-syncope with hypotension 68/54. EMS reports blood pressure 151/98.  He has no complaints.  He denies fever, chills, shortness and breath, chest pain, headache, dizziness, lightheadedness, abdominal pain, he does endorse some mild dysuria.   Vital signs on arrival to ED temp 97.8°, heart rate 88, respirations 18, blood pressure 148/92, 97% SpO2 on room air.  While in ED blood pressure has trended as low as 109/65, currently 123/65.  Lab workup shows WBC 15.14, hemoglobin 12.6, hematocrit 40, platelets 417, albumin 2.6, AST 51, ALT 63, total bilirubin 0.5, glucose 129, potassium 3.9, sodium 140, chloride 106, BUN 15, creatinine 1.0, BNP 42, troponin 0.009, lactic acid 1.3, urinalysis with positive nitrite, 3+ leukocyte esterase, greater than 100 WBC, many bacteria.  CT head without contrast was done with no acute abnormality, shows atrophy and chronic white matter changes.  Chest x-ray shows no acute abnormality, prominent cardiac silhouette low lung volumes noted, lungs are clear.  He was given normal saline 500 mL fluid bolus and Rocephin 1 g IV piggyback.  Hospital Medicine team was consulted for admission due to near syncopal episode and urinary tract infection.      Overview/Hospital Course:  79 y/o male presented to the ER from NH after syncope versus near-syncope with hypotension 68/54.   UA positive nitrite, 3+ leukocyte esterase,  greater than 100 WBC, many bacteria   CT head without contrast was done with no acute abnormality, shows atrophy and chronic white matter changes.   Chest x-ray shows no acute abnormality, prominent cardiac silhouette low lung volumes noted, lungs are clear.   IV abx- Rocephin 1g   BC pending   Urine culture pending  Wound care consulted  PT/OT to eval and treat     Interval History: f/u hypotension. Patient awake and alert, only oriented to self. ROS difficult due to patient's Alzheimer's dementia. Son at bedside, states father was even more confused yesterday and was very out of it yesterday. Patient denies any complaints at this time. Will continue IV abx, follow urine and blood cultures. Pt/ot to eval and treat.     Review of Systems   Reason unable to perform ROS: ROS limited due to Patients Alzheimers Dementia. Son aided in ROS.   Constitutional:  Positive for appetite change (weight loss).   Cardiovascular:  Positive for leg swelling. Negative for chest pain.   Gastrointestinal: Negative.  Negative for abdominal pain, nausea and vomiting.   Genitourinary:  Positive for dysuria.   Musculoskeletal:  Positive for arthralgias and myalgias.        Pain to right shoulder (chronic), followed by Dr. Joseph outpt    Neurological:  Positive for weakness (generalized).     Objective:     Vital Signs (Most Recent):  Temp: 98.5 °F (36.9 °C) (07/16/24 0728)  Pulse: 78 (07/16/24 0900)  Resp: 18 (07/16/24 0728)  BP: (!) 140/65 (07/16/24 0728)  SpO2: 97 % (07/16/24 0728) Vital Signs (24h Range):  Temp:  [97.5 °F (36.4 °C)-98.5 °F (36.9 °C)] 98.5 °F (36.9 °C)  Pulse:  [68-89] 78  Resp:  [17-23] 18  SpO2:  [96 %-100 %] 97 %  BP: (109-148)/(56-92) 140/65     Weight: 87.8 kg (193 lb 9 oz)  Body mass index is 27.77 kg/m².    Intake/Output Summary (Last 24 hours) at 7/16/2024 1055  Last data filed at 7/16/2024 0855  Gross per 24 hour   Intake 1965.07 ml   Output --   Net 1965.07 ml         Physical Exam  Vitals and nursing note  reviewed.   Constitutional:       General: He is not in acute distress.     Appearance: He is ill-appearing (chronically).   HENT:      Mouth/Throat:      Mouth: Mucous membranes are dry.      Pharynx: Oropharynx is clear.   Eyes:      Pupils: Pupils are equal, round, and reactive to light.   Cardiovascular:      Rate and Rhythm: Normal rate and regular rhythm.      Heart sounds: No murmur heard.  Pulmonary:      Effort: Pulmonary effort is normal.      Breath sounds: Normal breath sounds. No wheezing.   Abdominal:      General: Bowel sounds are normal. There is no distension.      Palpations: Abdomen is soft.      Tenderness: There is no abdominal tenderness.   Musculoskeletal:         General: Signs of injury (s/p fall a few months ago to right shoulder-limited ROM. Followed by Dr. Joseph) present. Normal range of motion.      Right lower leg: Edema (+1) present.      Left lower leg: Edema (+1) present.   Skin:     General: Skin is warm and dry.   Neurological:      General: No focal deficit present.      Mental Status: He is alert and oriented to person, place, and time.      Motor: Weakness (generalized) present.             Significant Labs: All pertinent labs within the past 24 hours have been reviewed.  Recent Lab Results  (Last 5 results in the past 24 hours)        07/16/24  0610   07/16/24  0025   07/15/24  2018   07/15/24  1846   07/15/24  1845        Albumin 2.2               ALP 78               ALT 41               Anion Gap 9               AST 29               Baso # 0.10               Basophil % 0.9               BILIRUBIN TOTAL 0.6  Comment: For infants and newborns, interpretation of results should be based  on gestational age, weight and in agreement with clinical  observations.    Premature Infant recommended reference ranges:  Up to 24 hours.............<8.0 mg/dL  Up to 48 hours............<12.0 mg/dL  3-5 days..................<15.0 mg/dL  6-29 days.................<15.0 mg/dL                  Blood Culture, Routine         No Growth to date  [P]                No Growth to date  [P]       BUN 10               Calcium 8.6               Chloride 109               CO2 23               Creatinine 0.7               Differential Method Automated               eGFR >60               Eos # 0.2               Eos % 1.8               Glucose 118               Gran # (ANC) 7.9               Gran % 68.5               Hematocrit 36.8               Hemoglobin 11.6               Immature Grans (Abs) 0.07  Comment: Mild elevation in immature granulocytes is non specific and   can be seen in a variety of conditions including stress response,   acute inflammation, trauma and pregnancy. Correlation with other   laboratory and clinical findings is essential.                 Immature Granulocytes 0.6               Lactic Acid Level       1.3  Comment: Falsely low lactic acid results can be found in samples   containing >=13.0 mg/dL total bilirubin and/or >=3.5 mg/dL   direct bilirubin.           Lymph # 2.4               Lymph % 20.9               Magnesium  2.1               MCH 28.6               MCHC 31.5               MCV 91               Mono # 0.8               Mono % 7.3               MPV 9.5               nRBC 0               Platelet Count 366               Potassium 3.7               PROTEIN TOTAL 6.2               RBC 4.05               RDW 13.2               Sodium 141               Troponin I   0.015  Comment: The reference interval for Troponin I represents the 99th percentile   cutoff   for our facility and is consistent with 3rd generation assay   performance.     0.010  Comment: The reference interval for Troponin I represents the 99th percentile   cutoff   for our facility and is consistent with 3rd generation assay   performance.             Uric Acid     6.3           Valproic Acid Lvl     <12.5  Comment: Valproic Acid (ug/ml)  Toxic:   >100.0 ug/ml             WBC 11.52                                        [P] - Preliminary Result               Significant Imaging: I have reviewed all pertinent imaging results/findings within the past 24 hours.    Assessment/Plan:      * Acute cystitis with hematuria  Rocephin ordered  Monitor urine culture and adjust antibiotics as indicated  BC pending     DANNY (generalized anxiety disorder)  Continue home meds      PTSD (post-traumatic stress disorder)  Continue home meds      Stage 3a chronic kidney disease  Creatine stable for now. BMP reviewed- noted Estimated Creatinine Clearance: 97.1 mL/min (based on SCr of 0.7 mg/dL). according to latest data. Based on current GFR, CKD stage is stage 3 - GFR 30-59.  Monitor UOP and serial BMP and adjust therapy as needed. Renally dose meds. Avoid nephrotoxic medications and procedures.    Essential hypertension  Chronic, controlled. Latest blood pressure and vitals reviewed-     Temp:  [97.5 °F (36.4 °C)-98.5 °F (36.9 °C)]   Pulse:  [68-89]   Resp:  [17-23]   BP: (109-148)/(56-92)   SpO2:  [96 %-100 %] .   Home meds for hypertension were reviewed and noted below.   Hypertension Medications               amLODIPine (NORVASC) 10 MG tablet Take 10 mg by mouth once daily.    furosemide (LASIX) 20 MG tablet Take 20 mg by mouth daily as needed.            While in the hospital, will manage blood pressure as follows; Holding home medications for now    Will utilize p.r.n. blood pressure medication only if patient's blood pressure greater than 180/110 and he develops symptoms such as worsening chest pain or shortness of breath.    Late onset Alzheimer's dementia with mood disturbance  Continue Aricept, Namenda, and Risperdal  Fall precautions      Near syncope  Near-syncope versus possible syncopal episode at nursing home blood pressure at nursing home 68/54  While in ED, he is alert and responsive, blood pressure has been normal  CT head without acute abnormality  Initial troponin is normal, we will trend  Cardiac monitoring ordered  Hold blood  pressure medication-amlodipine, for now        VTE Risk Mitigation (From admission, onward)           Ordered     enoxaparin injection 40 mg  Daily         07/15/24 2000     IP VTE HIGH RISK PATIENT  Once         07/15/24 2000     Place sequential compression device  Until discontinued         07/15/24 2000                    Discharge Planning   BRODIE:      Code Status: Full Code   Is the patient medically ready for discharge?:     Reason for patient still in hospital (select all that apply): Patient trending condition, Laboratory test, and Treatment               The patient's case has been reviewed by my supervising physician.   Supervising physician will provide additional orders and recommendations at his/her discretion.  Please see supervising physicians documentation and/or orders for further details.         Alanna Sofia NP  Department of Hospital Medicine   [facility]

## 2024-07-16 NOTE — PLAN OF CARE
07/16/24 1701   Post-Acute Status   Post-Acute Authorization Placement   Post-Acute Placement Status Referrals Sent   Discharge Plan   Discharge Plan A Return to nursing home     Referral sent to Ani ugtiérrez via [facility].

## 2024-07-16 NOTE — H&P
[facility] [address] [place] [address] Medicine  History & Physical    Patient Name: Gabo Altamirano  MRN: [id number]  Patient Class: OP- Observation  Admission Date: 7/15/2024  Attending Physician: Lisa Andrade MD   Primary Care Provider: Maru Primary Doctor         Patient information was obtained from patient, nursing home, past medical records, and ER records.     Subjective:     Principal Problem:Acute cystitis with hematuria    Chief Complaint:   Chief Complaint   Patient presents with    Hypotension     Hypotension at the nursing home, normaltensive in the ambulance 151/98. Possible syncopal episode per staff         HPI: Patient is 78-year-old male with past medical history significant for Alzheimer's dementia, PTSD, generalized anxiety disorder, hypertension, CKD 3A, bladder cancer, and gout who presented from nursing home after syncope versus near-syncope with hypotension 68/54. EMS reports blood pressure 151/98.  He has no complaints.  He denies fever, chills, shortness and breath, chest pain, headache, dizziness, lightheadedness, abdominal pain, he does endorse some mild dysuria.   Vital signs on arrival to ED temp 97.8°, heart rate 88, respirations 18, blood pressure 148/92, 97% SpO2 on room air.  While in ED blood pressure has trended as low as 109/65, currently 123/65.  Lab workup shows WBC 15.14, hemoglobin 12.6, hematocrit 40, platelets 417, albumin 2.6, AST 51, ALT 63, total bilirubin 0.5, glucose 129, potassium 3.9, sodium 140, chloride 106, BUN 15, creatinine 1.0, BNP 42, troponin 0.009, lactic acid 1.3, urinalysis with positive nitrite, 3+ leukocyte esterase, greater than 100 WBC, many bacteria.  CT head without contrast was done with no acute abnormality, shows atrophy and chronic white matter changes.  Chest x-ray shows no acute abnormality, prominent cardiac silhouette low lung volumes noted, lungs are clear.  He was given normal saline 500 mL fluid bolus and Rocephin 1 g IV piggyback.  Hospital  Medicine team was consulted for admission due to near syncopal episode and urinary tract infection.      Past Medical History:   Diagnosis Date    Alzheimer's dementia     Anxiety disorder, unspecified     Bladder cancer     CKD (chronic kidney disease) stage 3, GFR 30-59 ml/min     Gout, unspecified     Hypertension     PTSD (post-traumatic stress disorder)        Past Surgical History:   Procedure Laterality Date    COLONOSCOPY      ESOPHAGOGASTRODUODENOSCOPY      HAND SURGERY Right     JOINT REPLACEMENT         Review of patient's allergies indicates:  No Known Allergies    No current facility-administered medications on file prior to encounter.     Current Outpatient Medications on File Prior to Encounter   Medication Sig    amLODIPine (NORVASC) 10 MG tablet Take 10 mg by mouth once daily.    aspirin 81 MG Chew Take 81 mg by mouth once daily.    divalproex (DEPAKOTE) 125 MG EC tablet Take 125 mg by mouth 2 (two) times daily.    donepeziL (ARICEPT) 10 MG tablet Take 10 mg by mouth once daily.    famotidine (PEPCID) 20 MG tablet Take 20 mg by mouth 2 (two) times daily.    furosemide (LASIX) 20 MG tablet Take 20 mg by mouth daily as needed.    lactulose (CHRONULAC) 10 gram/15 mL solution Take 20 g by mouth once daily.    melatonin (MELATIN) 3 mg tablet Take 3 mg by mouth nightly.    memantine (NAMENDA) 10 MG Tab Take 10 mg by mouth 2 (two) times daily.    risperiDONE (RISPERDAL) 0.5 MG Tab Take 0.5 mg by mouth once daily.    risperiDONE (RISPERDAL) 1 MG tablet Take 1 mg by mouth every evening.    senna (SENOKOT) 8.6 mg tablet Take 2 tablets by mouth once daily.     Family History       Problem Relation (Age of Onset)    Cancer Mother, Father    Pancreatitis Father          Tobacco Use    Smoking status: Never    Smokeless tobacco: Former   Substance and Sexual Activity    Alcohol use: Not Currently    Drug use: Not Currently    Sexual activity: Not on file     Review of Systems   Reason unable to perform ROS:  limited due to baseline confusion with Alzheimer's dementia.   Constitutional: Negative.  Negative for chills and fever.        Per chart - weight loss 30 lbs in past 6 months   HENT: Negative.  Negative for sore throat and trouble swallowing.    Eyes: Negative.    Respiratory: Negative.  Negative for cough, chest tightness, shortness of breath and wheezing.    Cardiovascular:  Positive for leg swelling. Negative for chest pain and palpitations.   Gastrointestinal: Negative.  Negative for abdominal distention, abdominal pain, diarrhea, nausea and vomiting.   Endocrine: Negative.    Genitourinary:  Positive for dysuria.   Musculoskeletal: Negative.    Skin: Negative.    Allergic/Immunologic: Negative.    Neurological:  Positive for syncope and weakness. Negative for dizziness, seizures, speech difficulty, light-headedness and headaches.        Near-syncope with BP 68/54 at nursing home   Psychiatric/Behavioral: Negative.     All other systems reviewed and are negative.    Objective:     Vital Signs (Most Recent):  Temp: 97.5 °F (36.4 °C) (07/15/24 2000)  Pulse: 70 (07/15/24 2000)  Resp: 18 (07/15/24 2000)  BP: 123/65 (07/15/24 2000)  SpO2: 96 % (07/15/24 2000) Vital Signs (24h Range):  Temp:  [97.5 °F (36.4 °C)-97.8 °F (36.6 °C)] 97.5 °F (36.4 °C)  Pulse:  [70-89] 70  Resp:  [17-23] 18  SpO2:  [96 %-100 %] 96 %  BP: (109-148)/(56-92) 123/65     Weight: 88 kg (194 lb 0.1 oz)  Body mass index is 27.84 kg/m².     Physical Exam  Vitals reviewed.   Constitutional:       General: He is not in acute distress.     Appearance: He is ill-appearing. He is not toxic-appearing or diaphoretic.   HENT:      Head: Normocephalic and atraumatic.      Nose: Nose normal.      Mouth/Throat:      Mouth: Mucous membranes are dry.   Eyes:      Pupils: Pupils are equal, round, and reactive to light.   Cardiovascular:      Rate and Rhythm: Normal rate and regular rhythm.      Heart sounds: Normal heart sounds.   Pulmonary:      Effort:  Pulmonary effort is normal. No respiratory distress.      Breath sounds: Normal breath sounds. No stridor. No wheezing, rhonchi or rales.   Chest:      Chest wall: No tenderness.   Abdominal:      General: Bowel sounds are normal. There is no distension.      Palpations: Abdomen is soft.      Tenderness: There is no abdominal tenderness. There is no right CVA tenderness, left CVA tenderness, guarding or rebound.   Genitourinary:     Comments: deferred  Musculoskeletal:      Cervical back: Neck supple.      Right lower leg: Edema present.      Left lower leg: Edema present.      Comments: 1+ lower extremity edema bilaterally   Skin:     General: Skin is warm and dry.      Capillary Refill: Capillary refill takes less than 2 seconds.   Neurological:      Mental Status: He is alert. Mental status is at baseline.      Motor: Weakness present.      Comments: Confused, but pleasant and cooperative at this time  Oriented to person, disoriented to place, time, situation  Follows commands, generalized weakness noted, WALDRON=     Psychiatric:         Mood and Affect: Mood normal.         Behavior: Behavior normal.              CRANIAL NERVES     CN III, IV, VI   Pupils are equal, round, and reactive to light.       Significant Labs: All pertinent labs within the past 24 hours have been reviewed.  CBC:   Recent Labs   Lab 07/15/24  1509   WBC 15.14*   HGB 12.6*   HCT 40.0        CMP:   Recent Labs   Lab 07/15/24  1509      K 3.9      CO2 23   *   BUN 15   CREATININE 1.0   CALCIUM 8.8   PROT 7.2   ALBUMIN 2.6*   BILITOT 0.5   ALKPHOS 98   AST 51*   ALT 63*   ANIONGAP 11     Lactic Acid:   Recent Labs   Lab 07/15/24  1846   LACTATE 1.3     Troponin:   Recent Labs   Lab 07/15/24  1509   TROPONINI 0.009     BNP 42    Urine Studies:   Recent Labs   Lab 07/15/24  1709   COLORU Yellow   APPEARANCEUA Hazy*   PHUR 6.0   SPECGRAV 1.015   PROTEINUA Trace*   GLUCUA Negative   KETONESU Negative   BILIRUBINUA Negative    OCCULTUA Trace*   NITRITE Positive*   UROBILINOGEN 2.0-3.0*   LEUKOCYTESUR 3+*   RBCUA 7*   WBCUA >100*   BACTERIA Many*   SQUAMEPITHEL 8   HYALINECASTS 3*     EKG reviewed, normal sinus rhythm, 84, no STEMI, junctional ST depression    Significant Imaging: I have reviewed all pertinent imaging results/findings within the past 24 hours.    CT head without contrast:  No acute abnormality, atrophy and chronic white matter changes    Chest x-ray-per my interpretation, lungs are clear, no acute abnormality, prominent cardiac silhouette    Assessment/Plan:     * Acute cystitis with hematuria  Rocephin ordered  Monitor urine culture and adjust antibiotics as indicated      Near syncope  Near-syncope versus possible syncopal episode at nursing home blood pressure at nursing home 68/54  While in ED, he is alert and responsive, blood pressure has been normal  CT head without acute abnormality  Initial troponin is normal, we will trend  Cardiac monitoring ordered  Hold blood pressure medication-amlodipine, for now      DANNY (generalized anxiety disorder)  Continue home meds      PTSD (post-traumatic stress disorder)  Continue home meds      Stage 3a chronic kidney disease  Creatine stable for now. BMP reviewed- noted Estimated Creatinine Clearance: 68 mL/min (based on SCr of 1 mg/dL). according to latest data. Based on current GFR, CKD stage is stage 3 - GFR 30-59.  Monitor UOP and serial BMP and adjust therapy as needed. Renally dose meds. Avoid nephrotoxic medications and procedures.    Essential hypertension  Chronic, controlled. Latest blood pressure and vitals reviewed-     Temp:  [97.5 °F (36.4 °C)-97.8 °F (36.6 °C)]   Pulse:  [70-89]   Resp:  [17-23]   BP: (109-148)/(56-92)   SpO2:  [96 %-100 %] .   Home meds for hypertension were reviewed and noted below.   Hypertension Medications               amLODIPine (NORVASC) 10 MG tablet Take 10 mg by mouth once daily.    furosemide (LASIX) 20 MG tablet Take 20 mg by mouth  daily as needed.            While in the hospital, will manage blood pressure as follows; Holding home medications for now    Will utilize p.r.n. blood pressure medication only if patient's blood pressure greater than 180/110 and he develops symptoms such as worsening chest pain or shortness of breath.    Late onset Alzheimer's dementia with mood disturbance  Continue Aricept, Namenda, and Risperdal  Fall precautions        VTE Risk Mitigation (From admission, onward)           Ordered     enoxaparin injection 40 mg  Daily         07/15/24 2000     IP VTE HIGH RISK PATIENT  Once         07/15/24 2000     Place sequential compression device  Until discontinued         07/15/24 2000                     On 07/15/2024, patient should be placed in hospital observation services under my care in collaboration with Dr. Lisa Andrade.         Ludy Champion NP  Department of Hospital Medicine  [facility]

## 2024-07-16 NOTE — CONSULTS
[facility] - Med Surg 3  Wound Care    Patient Name:  Gabo Altamirano   MRN:  [id number]  Date: 7/16/2024  Diagnosis: Acute cystitis with hematuria    History:     Past Medical History:   Diagnosis Date    Alzheimer's dementia     Anxiety disorder, unspecified     Bladder cancer     CKD (chronic kidney disease) stage 3, GFR 30-59 ml/min     Gout, unspecified     Hypertension     PTSD (post-traumatic stress disorder)        Social History     Socioeconomic History    Marital status:    Tobacco Use    Smoking status: Never    Smokeless tobacco: Former   Substance and Sexual Activity    Alcohol use: Not Currently    Drug use: Not Currently     Social Determinants of Health     Financial Resource Strain: Low Risk  (12/8/2021)    Received from [facility]sidiaries and Affiliates, [facility] Subsidi[facility] and [facility]    Overall Financial Resource Strain (CARDIA)     Difficulty of Paying Living Expenses: Not hard at all   Food Insecurity: Unknown (12/8/2021)    Received from [facility] and Affiliates, [facility] Subsidi[facility] and Affiliates    Hunger Vital Sign     Worried About Running Out of Food in the Last Year: Never true   Transportation Needs: Unknown (12/8/2021)    Received from [facility] Sub[facility] and Affiliates, [facility] Subsidiaries and Affiliates    PRAPARE - Transportation     Lack of Transportation (Medical): No   Physical Activity: Unknown (12/8/2021)    Received from [facility] Sub[facility] and Affiliates, [facility] Subsidiaries and Affiliates    Exercise Vital Sign     Days of Exercise per Week: 0 days    Stress: No Stress Concern Present (12/8/2021)    [facility], [facility]    [employer] [place] [employer] - Occupational Stress Questionnaire     Feeling of Stress : Not at all   Housing Stability: Unknown (12/8/2021)    [facility], [facility]    Housing Stability Vital Sign     Unable to Pay for Housing in the Last Year: No       Precautions:     Allergies as of 07/15/2024    (No Known Allergies)       [facility] Assessment Details/Treatment     Consulted on this 79 y/o Male patient for wounds to bilateral heels. Patient was admitted on 7/15/24 for acute cystitis with hematuria. PMH significant for dementia, gout, CKD stage 3, and patient is a nursing home resident.   Patient resting in bed, family present at the bedside state they knew about a wound to his back but not his heels. Patient wearing heel boots during visit. Heel boots removed, DTPI noted to the Right heel. Maroon/purple in color skin intact. DTPI also noted to medial aspect of left heel, maroon/purple in color fluid filled sac skin still intact no drainage noted. Bilateral heels cleansed with saline, patted dry and painted with cavilon barrier film. Recommend continuing to offload the heels with heel boots.   Patient turned and pressure injury noted to the sacrum, possibly full thickness but unable to determine full stage at this time due to dry dark tan tissue covering most of the wound bed. 40% of wound bed appears to be moist and red with small amount of moist yellow slough noted. Small amount of serosanguinous drainage noted. Cleansed with sterile saline, patted dry. Triad paste applied to the wound bed and foam dressing  applied on top. Patient was wearing a brief when WOC team arrived that was saturated with urine and had the foam dressing that was previously in place saturated in urine. Brief was removed and a quivi was placed in order to prevent further skin breakdown due to incontinence. Recommend changing sacral dressing daily and PRN with incontinence. Patient was turned towards the window and wedge was placed. Continue pressure injury prevention protocols.        07/16/24 0840   WOCN Assessment   WOCN Total Time (mins) 60   Visit Date 07/16/24   Visit Time 0945   Consult Type New   WOCN Speciality Wound   Wound pressure;deep tissue injury   Number of Wounds 3   Intervention assessed;applied;chart review;coordination of care   Teaching on-going   Positioning   Body Position turned;side-lying;left   Head of Bed (HOB) Positioning HOB at 30 degrees   Positioning/Transfer Devices pillows;wedge;applied        Wound 07/15/24 2015 Pressure Injury Right Heel   Date First Assessed/Time First Assessed: 07/15/24 2015   Present on Original Admission: Yes  Primary Wound Type: Pressure Injury  Side: Right  Location: Heel   Wound Image     Pressure Injury Stage DTPI   Dressing Appearance Open to air   Drainage Amount None   Appearance Maroon;Purple   Wound Length (cm) 4 cm   Wound Width (cm) 11 cm   Wound Surface Area (cm^2) 44 cm^2   Care Cleansed with:;Sterile normal saline;Applied:;Skin Barrier        Wound 07/15/24 2015 Pressure Injury Left Heel   Date First Assessed/Time First Assessed: 07/15/24 2015   Present on Original Admission: Yes  Primary Wound Type: Pressure Injury  Side: Left  Location: Heel   Wound Image    Pressure Injury Stage DTPI   Dressing Appearance Open to air   Drainage Amount None   Appearance Intact;Maroon;Purple;Blistered   Wound Length (cm) 4 cm   Wound Width (cm) 9 cm   Wound Surface Area (cm^2) 36 cm^2   Care Cleansed with:;Sterile normal saline;Applied:;Skin Barrier        Wound 07/16/24 0900 Pressure Injury  Sacral spine   Date First Assessed/Time First Assessed: 07/16/24 0900   Present on Original Admission: Yes  Primary Wound Type: Pressure Injury  Location: Sacral spine   Wound Image    Pressure Injury Stage U   Dressing Appearance Moist drainage   Drainage Amount Small   Drainage Characteristics/Odor Serosanguineous   Appearance Pink;Red;Yellow;Moist;Tan;Slough   Periwound Area Dry   Wound Length (cm) 8 cm   Wound Width (cm) 8 cm   Wound Depth (cm) 0.1 cm   Wound Volume (cm^3) 6.4 cm^3   Wound Surface Area (cm^2) 64 cm^2   Care Cleansed with:;Sterile normal saline;Applied:;Skin Barrier   Dressing Applied;Foam  (triad paste)   Periwound Care Skin barrier film applied   Dressing Change Due 07/17/24 07/16/2024

## 2024-07-16 NOTE — HPI
Patient is 78-year-old male with past medical history significant for Alzheimer's dementia, PTSD, generalized anxiety disorder, hypertension, CKD 3A, bladder cancer, and gout who presented from nursing home after syncope versus near-syncope with hypotension 68/54. EMS reports blood pressure 151/98.  He has no complaints.  He denies fever, chills, shortness and breath, chest pain, headache, dizziness, lightheadedness, abdominal pain, he does endorse some mild dysuria.   Vital signs on arrival to ED temp 97.8°, heart rate 88, respirations 18, blood pressure 148/92, 97% SpO2 on room air.  While in ED blood pressure has trended as low as 109/65, currently 123/65.  Lab workup shows WBC 15.14, hemoglobin 12.6, hematocrit 40, platelets 417, albumin 2.6, AST 51, ALT 63, total bilirubin 0.5, glucose 129, potassium 3.9, sodium 140, chloride 106, BUN 15, creatinine 1.0, BNP 42, troponin 0.009, lactic acid 1.3, urinalysis with positive nitrite, 3+ leukocyte esterase, greater than 100 WBC, many bacteria.  CT head without contrast was done with no acute abnormality, shows atrophy and chronic white matter changes.  Chest x-ray shows no acute abnormality, prominent cardiac silhouette low lung volumes noted, lungs are clear.  He was given normal saline 500 mL fluid bolus and Rocephin 1 g IV piggyback.  Hospital Medicine team was consulted for admission due to near syncopal episode and urinary tract infection.

## 2024-07-16 NOTE — SUBJECTIVE & OBJECTIVE
Past Medical History:   Diagnosis Date    Alzheimer's dementia     Anxiety disorder, unspecified     Bladder cancer     CKD (chronic kidney disease) stage 3, GFR 30-59 ml/min     Gout, unspecified     Hypertension     PTSD (post-traumatic stress disorder)        Past Surgical History:   Procedure Laterality Date    COLONOSCOPY      ESOPHAGOGASTRODUODENOSCOPY      HAND SURGERY Right     JOINT REPLACEMENT         Review of patient's allergies indicates:  No Known Allergies    No current facility-administered medications on file prior to encounter.     Current Outpatient Medications on File Prior to Encounter   Medication Sig    amLODIPine (NORVASC) 10 MG tablet Take 10 mg by mouth once daily.    aspirin 81 MG Chew Take 81 mg by mouth once daily.    divalproex (DEPAKOTE) 125 MG EC tablet Take 125 mg by mouth 2 (two) times daily.    donepeziL (ARICEPT) 10 MG tablet Take 10 mg by mouth once daily.    famotidine (PEPCID) 20 MG tablet Take 20 mg by mouth 2 (two) times daily.    furosemide (LASIX) 20 MG tablet Take 20 mg by mouth daily as needed.    lactulose (CHRONULAC) 10 gram/15 mL solution Take 20 g by mouth once daily.    melatonin (MELATIN) 3 mg tablet Take 3 mg by mouth nightly.    memantine (NAMENDA) 10 MG Tab Take 10 mg by mouth 2 (two) times daily.    risperiDONE (RISPERDAL) 0.5 MG Tab Take 0.5 mg by mouth once daily.    risperiDONE (RISPERDAL) 1 MG tablet Take 1 mg by mouth every evening.    senna (SENOKOT) 8.6 mg tablet Take 2 tablets by mouth once daily.     Family History       Problem Relation (Age of Onset)    Cancer Mother, Father    Pancreatitis Father          Tobacco Use    Smoking status: Never    Smokeless tobacco: Former   Substance and Sexual Activity    Alcohol use: Not Currently    Drug use: Not Currently    Sexual activity: Not on file     Review of Systems   Reason unable to perform ROS: limited due to baseline confusion with Alzheimer's dementia.   Constitutional: Negative.  Negative for chills  and fever.        Per chart - weight loss 30 lbs in past 6 months   HENT: Negative.  Negative for sore throat and trouble swallowing.    Eyes: Negative.    Respiratory: Negative.  Negative for cough, chest tightness, shortness of breath and wheezing.    Cardiovascular:  Positive for leg swelling. Negative for chest pain and palpitations.   Gastrointestinal: Negative.  Negative for abdominal distention, abdominal pain, diarrhea, nausea and vomiting.   Endocrine: Negative.    Genitourinary:  Positive for dysuria.   Musculoskeletal: Negative.    Skin: Negative.    Allergic/Immunologic: Negative.    Neurological:  Positive for syncope and weakness. Negative for dizziness, seizures, speech difficulty, light-headedness and headaches.        Near-syncope with BP 68/54 at nursing home   Psychiatric/Behavioral: Negative.     All other systems reviewed and are negative.    Objective:     Vital Signs (Most Recent):  Temp: 97.5 °F (36.4 °C) (07/15/24 2000)  Pulse: 70 (07/15/24 2000)  Resp: 18 (07/15/24 2000)  BP: 123/65 (07/15/24 2000)  SpO2: 96 % (07/15/24 2000) Vital Signs (24h Range):  Temp:  [97.5 °F (36.4 °C)-97.8 °F (36.6 °C)] 97.5 °F (36.4 °C)  Pulse:  [70-89] 70  Resp:  [17-23] 18  SpO2:  [96 %-100 %] 96 %  BP: (109-148)/(56-92) 123/65     Weight: 88 kg (194 lb 0.1 oz)  Body mass index is 27.84 kg/m².     Physical Exam  Vitals reviewed.   Constitutional:       General: He is not in acute distress.     Appearance: He is ill-appearing. He is not toxic-appearing or diaphoretic.   HENT:      Head: Normocephalic and atraumatic.      Nose: Nose normal.      Mouth/Throat:      Mouth: Mucous membranes are dry.   Eyes:      Pupils: Pupils are equal, round, and reactive to light.   Cardiovascular:      Rate and Rhythm: Normal rate and regular rhythm.      Heart sounds: Normal heart sounds.   Pulmonary:      Effort: Pulmonary effort is normal. No respiratory distress.      Breath sounds: Normal breath sounds. No stridor. No  wheezing, rhonchi or rales.   Chest:      Chest wall: No tenderness.   Abdominal:      General: Bowel sounds are normal. There is no distension.      Palpations: Abdomen is soft.      Tenderness: There is no abdominal tenderness. There is no right CVA tenderness, left CVA tenderness, guarding or rebound.   Genitourinary:     Comments: deferred  Musculoskeletal:      Cervical back: Neck supple.      Right lower leg: Edema present.      Left lower leg: Edema present.      Comments: 1+ lower extremity edema bilaterally   Skin:     General: Skin is warm and dry.      Capillary Refill: Capillary refill takes less than 2 seconds.   Neurological:      Mental Status: He is alert. Mental status is at baseline.      Motor: Weakness present.      Comments: Confused, but pleasant and cooperative at this time  Oriented to person, disoriented to place, time, situation  Follows commands, generalized weakness noted, WALDRON=     Psychiatric:         Mood and Affect: Mood normal.         Behavior: Behavior normal.              CRANIAL NERVES     CN III, IV, VI   Pupils are equal, round, and reactive to light.       Significant Labs: All pertinent labs within the past 24 hours have been reviewed.  CBC:   Recent Labs   Lab 07/15/24  1509   WBC 15.14*   HGB 12.6*   HCT 40.0        CMP:   Recent Labs   Lab 07/15/24  1509      K 3.9      CO2 23   *   BUN 15   CREATININE 1.0   CALCIUM 8.8   PROT 7.2   ALBUMIN 2.6*   BILITOT 0.5   ALKPHOS 98   AST 51*   ALT 63*   ANIONGAP 11     Lactic Acid:   Recent Labs   Lab 07/15/24  1846   LACTATE 1.3     Troponin:   Recent Labs   Lab 07/15/24  1509   TROPONINI 0.009     BNP 42    Urine Studies:   Recent Labs   Lab 07/15/24  1709   COLORU Yellow   APPEARANCEUA Hazy*   PHUR 6.0   SPECGRAV 1.015   PROTEINUA Trace*   GLUCUA Negative   KETONESU Negative   BILIRUBINUA Negative   OCCULTUA Trace*   NITRITE Positive*   UROBILINOGEN 2.0-3.0*   LEUKOCYTESUR 3+*   RBCUA 7*   WBCUA >100*    BACTERIA Many*   SQUAMEPITHEL 8   HYALINECASTS 3*     EKG reviewed, normal sinus rhythm, 84, no STEMI, junctional ST depression    Significant Imaging: I have reviewed all pertinent imaging results/findings within the past 24 hours.    CT head without contrast:  No acute abnormality, atrophy and chronic white matter changes    Chest x-ray-per my interpretation, lungs are clear, no acute abnormality, prominent cardiac silhouette

## 2024-07-16 NOTE — ASSESSMENT & PLAN NOTE
Near-syncope versus possible syncopal episode at nursing home blood pressure at nursing home 68/54  While in ED, he is alert and responsive, blood pressure has been normal  CT head without acute abnormality  Initial troponin is normal, we will trend  Cardiac monitoring ordered  Hold blood pressure medication-amlodipine, for now

## 2024-07-16 NOTE — ASSESSMENT & PLAN NOTE
Chronic, controlled. Latest blood pressure and vitals reviewed-     Temp:  [97.5 °F (36.4 °C)-98.5 °F (36.9 °C)]   Pulse:  [68-89]   Resp:  [17-23]   BP: (109-148)/(56-92)   SpO2:  [96 %-100 %] .   Home meds for hypertension were reviewed and noted below.   Hypertension Medications               amLODIPine (NORVASC) 10 MG tablet Take 10 mg by mouth once daily.    furosemide (LASIX) 20 MG tablet Take 20 mg by mouth daily as needed.            While in the hospital, will manage blood pressure as follows; Holding home medications for now    Will utilize p.r.n. blood pressure medication only if patient's blood pressure greater than 180/110 and he develops symptoms such as worsening chest pain or shortness of breath.

## 2024-07-16 NOTE — ASSESSMENT & PLAN NOTE
Creatine stable for now. BMP reviewed- noted Estimated Creatinine Clearance: 68 mL/min (based on SCr of 1 mg/dL). according to latest data. Based on current GFR, CKD stage is stage 3 - GFR 30-59.  Monitor UOP and serial BMP and adjust therapy as needed. Renally dose meds. Avoid nephrotoxic medications and procedures.

## 2024-07-17 VITALS
HEART RATE: 79 BPM | WEIGHT: 193.56 LBS | HEIGHT: 70 IN | RESPIRATION RATE: 18 BRPM | BODY MASS INDEX: 27.71 KG/M2 | SYSTOLIC BLOOD PRESSURE: 142 MMHG | TEMPERATURE: 98 F | OXYGEN SATURATION: 96 % | DIASTOLIC BLOOD PRESSURE: 71 MMHG

## 2024-07-17 LAB
ALBUMIN SERPL BCP-MCNC: 2.3 G/DL (ref 3.5–5.2)
ALP SERPL-CCNC: 77 U/L (ref 55–135)
ALT SERPL W/O P-5'-P-CCNC: 42 U/L (ref 10–44)
ANION GAP SERPL CALC-SCNC: 11 MMOL/L (ref 8–16)
AST SERPL-CCNC: 27 U/L (ref 10–40)
BASOPHILS # BLD AUTO: 0.09 K/UL (ref 0–0.2)
BASOPHILS NFR BLD: 0.9 % (ref 0–1.9)
BILIRUB SERPL-MCNC: 0.5 MG/DL (ref 0.1–1)
BUN SERPL-MCNC: 6 MG/DL (ref 8–23)
CALCIUM SERPL-MCNC: 8.4 MG/DL (ref 8.7–10.5)
CHLORIDE SERPL-SCNC: 109 MMOL/L (ref 95–110)
CO2 SERPL-SCNC: 19 MMOL/L (ref 23–29)
CREAT SERPL-MCNC: 0.7 MG/DL (ref 0.5–1.4)
DIFFERENTIAL METHOD BLD: ABNORMAL
EOSINOPHIL # BLD AUTO: 0.2 K/UL (ref 0–0.5)
EOSINOPHIL NFR BLD: 1.9 % (ref 0–8)
ERYTHROCYTE [DISTWIDTH] IN BLOOD BY AUTOMATED COUNT: 13 % (ref 11.5–14.5)
EST. GFR  (NO RACE VARIABLE): >60 ML/MIN/1.73 M^2
GLUCOSE SERPL-MCNC: 110 MG/DL (ref 70–110)
HCT VFR BLD AUTO: 36.2 % (ref 40–54)
HGB BLD-MCNC: 11.2 G/DL (ref 14–18)
IMM GRANULOCYTES # BLD AUTO: 0.05 K/UL (ref 0–0.04)
IMM GRANULOCYTES NFR BLD AUTO: 0.5 % (ref 0–0.5)
LYMPHOCYTES # BLD AUTO: 2.2 K/UL (ref 1–4.8)
LYMPHOCYTES NFR BLD: 20.7 % (ref 18–48)
MAGNESIUM SERPL-MCNC: 2 MG/DL (ref 1.6–2.6)
MCH RBC QN AUTO: 28.3 PG (ref 27–31)
MCHC RBC AUTO-ENTMCNC: 30.9 G/DL (ref 32–36)
MCV RBC AUTO: 91 FL (ref 82–98)
MONOCYTES # BLD AUTO: 0.9 K/UL (ref 0.3–1)
MONOCYTES NFR BLD: 8.8 % (ref 4–15)
NEUTROPHILS # BLD AUTO: 7 K/UL (ref 1.8–7.7)
NEUTROPHILS NFR BLD: 67.2 % (ref 38–73)
NRBC BLD-RTO: 0 /100 WBC
PLATELET # BLD AUTO: 358 K/UL (ref 150–450)
PMV BLD AUTO: 9.6 FL (ref 9.2–12.9)
POTASSIUM SERPL-SCNC: 4 MMOL/L (ref 3.5–5.1)
PROT SERPL-MCNC: 5.8 G/DL (ref 6–8.4)
RBC # BLD AUTO: 3.96 M/UL (ref 4.6–6.2)
SODIUM SERPL-SCNC: 139 MMOL/L (ref 136–145)
WBC # BLD AUTO: 10.41 K/UL (ref 3.9–12.7)

## 2024-07-17 PROCEDURE — 25000003 PHARM REV CODE 250: Performed by: NURSE PRACTITIONER

## 2024-07-17 PROCEDURE — 36415 COLL VENOUS BLD VENIPUNCTURE: CPT | Performed by: NURSE PRACTITIONER

## 2024-07-17 PROCEDURE — 85025 COMPLETE CBC W/AUTO DIFF WBC: CPT | Performed by: NURSE PRACTITIONER

## 2024-07-17 PROCEDURE — 83735 ASSAY OF MAGNESIUM: CPT | Performed by: NURSE PRACTITIONER

## 2024-07-17 PROCEDURE — 80053 COMPREHEN METABOLIC PANEL: CPT | Performed by: NURSE PRACTITIONER

## 2024-07-17 RX ORDER — CEFDINIR 300 MG/1
300 CAPSULE ORAL 2 TIMES DAILY
Start: 2024-07-17 | End: 2024-07-24

## 2024-07-17 RX ADMIN — SENNOSIDES 2 TABLET: 8.6 TABLET, FILM COATED ORAL at 10:07

## 2024-07-17 RX ADMIN — DIVALPROEX SODIUM 125 MG: 125 CAPSULE ORAL at 09:07

## 2024-07-17 RX ADMIN — RISPERIDONE 0.5 MG: 0.5 TABLET ORAL at 10:07

## 2024-07-17 RX ADMIN — MEMANTINE 10 MG: 10 TABLET ORAL at 09:07

## 2024-07-17 RX ADMIN — ASPIRIN 81 MG CHEWABLE TABLET 81 MG: 81 TABLET CHEWABLE at 10:07

## 2024-07-17 RX ADMIN — FAMOTIDINE 20 MG: 20 TABLET ORAL at 09:07

## 2024-07-17 RX ADMIN — LACTULOSE 20 G: 20 SOLUTION ORAL at 10:07

## 2024-07-17 RX ADMIN — DONEPEZIL HYDROCHLORIDE 10 MG: 5 TABLET, FILM COATED ORAL at 10:07

## 2024-07-17 NOTE — ASSESSMENT & PLAN NOTE
Chronic, controlled. Latest blood pressure and vitals reviewed-     Temp:  [97.7 °F (36.5 °C)-98.8 °F (37.1 °C)]   Pulse:  [65-95]   Resp:  [18]   BP: (125-145)/(61-93)   SpO2:  [94 %-98 %] .   Home meds for hypertension were reviewed and noted below.   Hypertension Medications               amLODIPine (NORVASC) 10 MG tablet Take 10 mg by mouth once daily.    furosemide (LASIX) 20 MG tablet Take 20 mg by mouth daily as needed.            While in the hospital, will manage blood pressure as follows; Holding home medications for now    Will utilize p.r.n. blood pressure medication only if patient's blood pressure greater than 180/110 and he develops symptoms such as worsening chest pain or shortness of breath.

## 2024-07-17 NOTE — ASSESSMENT & PLAN NOTE
Rocephin ordered  Monitor urine culture and adjust antibiotics as indicated  BC: NGTD   Urine culture: E. Coli with sensitivities pending     Discharged with PO prescription for Omnicef BID for 7 days

## 2024-07-17 NOTE — PLAN OF CARE
07/17/24 1317   Post-Acute Status   Post-Acute Authorization Placement   Post-Acute Placement Status Set-up Complete/Auth obtained   Discharge Plan   Discharge Plan A Return to nursing home     Returning to [facility].  Discharge orders sent via careport.\    Please call report to [phone] - Nurse on [phone].  Wheelchair transport will  at 2pm.  Facility is sending a CNA to sit with the patient In transit.

## 2024-07-17 NOTE — DISCHARGE SUMMARY
[facility]  Hospital Medicine  Discharge Summary      Patient Name: Gabo Altamirano  MRN: [id number]  NICO: [id number]  Patient Class: IP- Inpatient  Admission Date: 7/15/2024  Hospital Length of Stay: 1 days  Discharge Date and Time:  07/17/2024 9:56 AM  Attending Physician: Jacoby Ceballos MD   Discharging Provider: Alanna Sofia NP  Primary Care Provider: Maru Primary Doctor    Primary Care Team: Networked reference to record PCT     HPI:   Patient is 78-year-old male with past medical history significant for Alzheimer's dementia, PTSD, generalized anxiety disorder, hypertension, CKD 3A, bladder cancer, and gout who presented from nursing home after syncope versus near-syncope with hypotension 68/54. EMS reports blood pressure 151/98.  He has no complaints.  He denies fever, chills, shortness and breath, chest pain, headache, dizziness, lightheadedness, abdominal pain, he does endorse some mild dysuria.   Vital signs on arrival to ED temp 97.8°, heart rate 88, respirations 18, blood pressure 148/92, 97% SpO2 on room air.  While in ED blood pressure has trended as low as 109/65, currently 123/65.  Lab workup shows WBC 15.14, hemoglobin 12.6, hematocrit 40, platelets 417, albumin 2.6, AST 51, ALT 63, total bilirubin 0.5, glucose 129, potassium 3.9, sodium 140, chloride 106, BUN 15, creatinine 1.0, BNP 42, troponin 0.009, lactic acid 1.3, urinalysis with positive nitrite, 3+ leukocyte esterase, greater than 100 WBC, many bacteria.  CT head without contrast was done with no acute abnormality, shows atrophy and chronic white matter changes.  Chest x-ray shows no acute abnormality, prominent cardiac silhouette low lung volumes noted, lungs are clear.  He was given normal saline 500 mL fluid bolus and Rocephin 1 g IV piggyback.  Hospital Medicine team was consulted for admission due to near syncopal episode and urinary tract infection.      * No surgery found *      Hospital Course:   79 y/o male presented  to the ER from NH after syncope versus near-syncope with hypotension 68/54.   UA positive nitrite, 3+ leukocyte esterase, greater than 100 WBC, many bacteria   CT head without contrast was done with no acute abnormality, shows atrophy and chronic white matter changes.   Chest x-ray shows no acute abnormality, prominent cardiac silhouette low lung volumes noted, lungs are clear.   IV abx- Rocephin 1g   BC pending   Urine culture pending  Wound care consulted  PT/OT to eval and treat     Patient denies any complaints and states he is feeling well. Son at bedside.  Leukocytosis improved, h/h stable, and no electrolyte abnormalities noted in morning labs. Patient to be discharged back to nursing home. Will need follow up with PCP at nursing home. Patient discharged with prescription for omnicef BID for 7 days. Patient advised to hold amlodipine and lasix until follow up with PCP.   Patient seen and examined on the day of discharge.  All questions and concerns were addressed prior to discharge.    Face to face encounter with patient: 48 min      Goals of Care Treatment Preferences:  Code Status: Full Code      Consults:     Neuro  Late onset Alzheimer's dementia with mood disturbance  Continue Aricept, Namenda, and Risperdal  Fall precautions      Cardiac/Vascular  Essential hypertension  Chronic, controlled. Latest blood pressure and vitals reviewed-     Temp:  [97.7 °F (36.5 °C)-98.8 °F (37.1 °C)]   Pulse:  [65-95]   Resp:  [18]   BP: (125-145)/(61-93)   SpO2:  [94 %-98 %] .   Home meds for hypertension were reviewed and noted below.   Hypertension Medications               amLODIPine (NORVASC) 10 MG tablet Take 10 mg by mouth once daily.    furosemide (LASIX) 20 MG tablet Take 20 mg by mouth daily as needed.            While in the hospital, will manage blood pressure as follows; Holding home medications for now    Will utilize p.r.n. blood pressure medication only if patient's blood pressure greater than 180/110 and  he develops symptoms such as worsening chest pain or shortness of breath.    Renal/  * Acute cystitis with hematuria  Rocephin ordered  Monitor urine culture and adjust antibiotics as indicated  BC: NGTD   Urine culture: E. Coli with sensitivities pending     Discharged with PO prescription for Omnicef BID for 7 days    Stage 3a chronic kidney disease  Creatine stable for now. BMP reviewed- noted Estimated Creatinine Clearance: 97.1 mL/min (based on SCr of 0.7 mg/dL). according to latest data. Based on current GFR, CKD stage is stage 3 - GFR 30-59.  Monitor UOP and serial BMP and adjust therapy as needed. Renally dose meds. Avoid nephrotoxic medications and procedures.    Other  DANNY (generalized anxiety disorder)  Continue home meds      PTSD (post-traumatic stress disorder)  Continue home meds      Near syncope  Near-syncope versus possible syncopal episode at nursing home blood pressure at nursing home 68/54  While in ED, he is alert and responsive, blood pressure has been normal  CT head without acute abnormality  Initial troponin is normal, we will trend  Cardiac monitoring ordered  Hold blood pressure medication-amlodipine, for now, advised to follow up with PCP at NH prior to resuming         Final Active Diagnoses:    Diagnosis Date Noted POA    PRINCIPAL PROBLEM:  Acute cystitis with hematuria [N30.01] 07/15/2024 Yes    Essential hypertension [I10] 07/15/2024 Yes    Stage 3a chronic kidney disease [N18.31] 07/15/2024 Yes    PTSD (post-traumatic stress disorder) [F43.10] 07/15/2024 Yes    DANNY (generalized anxiety disorder) [F41.1] 07/15/2024 Yes    Near syncope [R55] 06/21/2024 Yes    Late onset Alzheimer's dementia with mood disturbance [G30.1, F02.83] 06/21/2024 Yes      Problems Resolved During this Admission:       Discharged Condition: good    Disposition: Another Health Care Inst*    Follow Up:   Follow-up Information       Yoselin Littlejohn MD Follow up.    Specialty: Internal Medicine  Why: MD seeing  "while in Nursing Home, follow up with MD at Nursing home. Hold amlodipine and lasix until follow up with MD  Contact information:  [address]  [place] [id number] [phone]                           Patient Instructions:      Diet Cardiac     Activity as tolerated       Significant Diagnostic Studies: Labs: BMP:   Recent Labs   Lab 07/15/24  1509 07/16/24  0610 07/17/24  0456   * 118* 110    141 139   K 3.9 3.7 4.0    109 109   CO2 23 23 19*   BUN 15 10 6*   CREATININE 1.0 0.7 0.7   CALCIUM 8.8 8.6* 8.4*   MG  --  2.1 2.0   , CMP   Recent Labs   Lab 07/15/24  1509 07/16/24  0610 07/17/24  0456    141 139   K 3.9 3.7 4.0    109 109   CO2 23 23 19*   * 118* 110   BUN 15 10 6*   CREATININE 1.0 0.7 0.7   CALCIUM 8.8 8.6* 8.4*   PROT 7.2 6.2 5.8*   ALBUMIN 2.6* 2.2* 2.3*   BILITOT 0.5 0.6 0.5   ALKPHOS 98 78 77   AST 51* 29 27   ALT 63* 41 42   ANIONGAP 11 9 11   , CBC   Recent Labs   Lab 07/15/24  1509 07/16/24  0610 07/17/24  0456   WBC 15.14* 11.52 10.41   HGB 12.6* 11.6* 11.2*   HCT 40.0 36.8* 36.2*    366 358   , INR No results found for: "INR", "PROTIME", Lipid Panel   Lab Results   Component Value Date    CHOL 162 05/06/2021    HDL 43 05/06/2021    LDLCALC 105 (H) 05/06/2021    TRIG 74 05/06/2021   , Troponin   Recent Labs   Lab 07/15/24  1509 07/15/24  2018 07/16/24  0025   TROPONINI 0.009 0.010 0.015   , A1C: No results for input(s): "HGBA1C" in the last 4320 hours., and All labs within the past 24 hours have been reviewed  Microbiology: Blood Culture   Lab Results   Component Value Date    LABBLOO No Growth to date 07/15/2024    LABBLOO No Growth to date 07/15/2024    LABBLOO No Growth to date 07/15/2024    LABBLOO No Growth to date 07/15/2024    and Urine Culture    Lab Results   Component Value Date    LABURIN (A) 07/15/2024     PRESUMPTIVE E COLI  >100,000 cfu/ml  Identification and susceptibility pending         Pending Diagnostic Studies:       None "           Medications:  Reconciled Home Medications:      Medication List        START taking these medications      cefdinir 300 MG capsule  Commonly known as: OMNICEF  Take 1 capsule (300 mg total) by mouth 2 (two) times daily. for 7 days            CONTINUE taking these medications      amLODIPine 10 MG tablet  Commonly known as: NORVASC  Take 10 mg by mouth once daily.  Notes to patient: FOLLOW UP WITH PCP PRIOR TO RESUMING      aspirin 81 MG Chew  Take 81 mg by mouth once daily.     divalproex 125 MG EC tablet  Commonly known as: DEPAKOTE  Take 125 mg by mouth 2 (two) times daily.     donepeziL 10 MG tablet  Commonly known as: ARICEPT  Take 10 mg by mouth once daily.     famotidine 20 MG tablet  Commonly known as: PEPCID  Take 20 mg by mouth 2 (two) times daily.     furosemide 20 MG tablet  Commonly known as: LASIX  Take 20 mg by mouth daily as needed.  Notes to patient: FOLLOW UP WITH PCP PRIOR TO RESUMING      lactulose 10 gram/15 mL solution  Commonly known as: CHRONULAC  Take 20 g by mouth once daily.     melatonin 3 mg tablet  Commonly known as: MELATIN  Take 3 mg by mouth nightly.     memantine 10 MG Tab  Commonly known as: NAMENDA  Take 10 mg by mouth 2 (two) times daily.     * risperiDONE 0.5 MG Tab  Commonly known as: RISPERDAL  Take 0.5 mg by mouth once daily.     * risperiDONE 1 MG tablet  Commonly known as: RISPERDAL  Take 1 mg by mouth every evening.     senna 8.6 mg tablet  Commonly known as: SENOKOT  Take 2 tablets by mouth once daily.           * This list has 2 medication(s) that are the same as other medications prescribed for you. Read the directions carefully, and ask your doctor or other care provider to review them with you.                  Indwelling Lines/Drains at time of discharge:   Lines/Drains/Airways       None                   Time spent on the discharge of patient: 55 minutes     The patient's case has been reviewed by my supervising physician.   Supervising physician will  provide additional orders and recommendations at his/her discretion.  Please see supervising physicians documentation and/or orders for further details.       Alanna Sofia NP  Department of Hospital Medicine  [facility]

## 2024-07-17 NOTE — ASSESSMENT & PLAN NOTE
Near-syncope versus possible syncopal episode at nursing home blood pressure at nursing home 68/54  While in ED, he is alert and responsive, blood pressure has been normal  CT head without acute abnormality  Initial troponin is normal, we will trend  Cardiac monitoring ordered  Hold blood pressure medication-amlodipine, for now, advised to follow up with PCP at NH prior to resuming

## 2024-07-17 NOTE — PLAN OF CARE
[facility]  Discharge Final Note    Primary Care Provider: No, Primary Doctor    Expected Discharge Date: 7/17/2024    Final Discharge Note (most recent)       Final Note - 07/17/24 1322          Final Note    Assessment Type Final Discharge Note (P)      Anticipated Discharge Disposition assisted Nursing Facility (P)         Post-Acute Status    Post-Acute Authorization Placement (P)      Post-Acute Placement Status Set-up Complete/Auth obtained (P)    [facility]    Discharge Delays None known at this time (P)                      Important Message from Medicare             Contact Info       Yoselin Littlejohn MD   Specialty: Internal Medicine    [address] [facility] [address]   Phone: [phone]       Next Steps: Follow up    Instructions: MD seeing while in Nursing Home, follow up with MD at Nursing home. Hold amlodipine and lasix until follow up with MD

## 2024-07-18 LAB — BACTERIA UR CULT: ABNORMAL

## 2024-07-19 LAB
OHS QRS DURATION: 78 MS
OHS QTC CALCULATION: 437 MS

## 2024-07-21 LAB
BACTERIA BLD CULT: NORMAL
BACTERIA BLD CULT: NORMAL

## 2024-09-24 ENCOUNTER — HOSPITAL ENCOUNTER (INPATIENT)
Facility: HOSPITAL | Age: 79
LOS: 2 days | DRG: 871 | End: 2024-09-26
Attending: EMERGENCY MEDICINE | Admitting: INTERNAL MEDICINE
Payer: MEDICARE

## 2024-09-24 DIAGNOSIS — R41.82 ALTERED MENTAL STATUS: ICD-10-CM

## 2024-09-24 DIAGNOSIS — G30.1 LATE ONSET ALZHEIMER'S DEMENTIA WITH MOOD DISTURBANCE, UNSPECIFIED DEMENTIA SEVERITY: ICD-10-CM

## 2024-09-24 DIAGNOSIS — R07.9 CHEST PAIN: ICD-10-CM

## 2024-09-24 DIAGNOSIS — F02.83 LATE ONSET ALZHEIMER'S DEMENTIA WITH MOOD DISTURBANCE, UNSPECIFIED DEMENTIA SEVERITY: ICD-10-CM

## 2024-09-24 DIAGNOSIS — A41.9 SEVERE SEPSIS: Primary | ICD-10-CM

## 2024-09-24 DIAGNOSIS — R65.20 SEVERE SEPSIS: Primary | ICD-10-CM

## 2024-09-24 DIAGNOSIS — L89.619 PRESSURE INJURY OF SKIN OF RIGHT HEEL, UNSPECIFIED INJURY STAGE: ICD-10-CM

## 2024-09-24 DIAGNOSIS — L89.159 PRESSURE INJURY OF SKIN OF SACRAL REGION, UNSPECIFIED INJURY STAGE: ICD-10-CM

## 2024-09-24 PROBLEM — L89.90 DECUBITUS ULCERS: Status: ACTIVE | Noted: 2024-09-24

## 2024-09-24 PROBLEM — K59.00 CONSTIPATION: Status: ACTIVE | Noted: 2024-09-24

## 2024-09-24 PROBLEM — R73.9 HYPERGLYCEMIA: Status: ACTIVE | Noted: 2024-09-24

## 2024-09-24 PROBLEM — G93.41 ACUTE METABOLIC ENCEPHALOPATHY: Status: ACTIVE | Noted: 2024-09-24

## 2024-09-24 PROBLEM — C67.9 BLADDER CANCER: Status: ACTIVE | Noted: 2024-09-24

## 2024-09-24 PROBLEM — I10 PRIMARY HYPERTENSION: Status: ACTIVE | Noted: 2024-09-24

## 2024-09-24 LAB
ALBUMIN SERPL BCP-MCNC: 3.1 G/DL (ref 3.5–5.2)
ALP SERPL-CCNC: 92 U/L (ref 55–135)
ALT SERPL W/O P-5'-P-CCNC: 13 U/L (ref 10–44)
AMMONIA PLAS-SCNC: 16 UMOL/L (ref 10–50)
AMPHET+METHAMPHET UR QL: NEGATIVE
ANION GAP SERPL CALC-SCNC: 11 MMOL/L (ref 8–16)
AST SERPL-CCNC: 23 U/L (ref 10–40)
BARBITURATES UR QL SCN>200 NG/ML: NEGATIVE
BASOPHILS # BLD AUTO: 0.08 K/UL (ref 0–0.2)
BASOPHILS NFR BLD: 0.4 % (ref 0–1.9)
BENZODIAZ UR QL SCN>200 NG/ML: NEGATIVE
BILIRUB SERPL-MCNC: 0.4 MG/DL (ref 0.1–1)
BILIRUB UR QL STRIP: NEGATIVE
BNP SERPL-MCNC: 25 PG/ML (ref 0–99)
BUN SERPL-MCNC: 12 MG/DL (ref 8–23)
BZE UR QL SCN: NEGATIVE
CALCIUM SERPL-MCNC: 10 MG/DL (ref 8.7–10.5)
CANNABINOIDS UR QL SCN: NEGATIVE
CHLORIDE SERPL-SCNC: 107 MMOL/L (ref 95–110)
CLARITY UR: CLEAR
CO2 SERPL-SCNC: 23 MMOL/L (ref 23–29)
COLOR UR: YELLOW
CREAT SERPL-MCNC: 1.1 MG/DL (ref 0.5–1.4)
CREAT UR-MCNC: 115.8 MG/DL (ref 23–375)
CRP SERPL-MCNC: 34.2 MG/L (ref 0–8.2)
DIFFERENTIAL METHOD BLD: ABNORMAL
EOSINOPHIL # BLD AUTO: 0.1 K/UL (ref 0–0.5)
EOSINOPHIL NFR BLD: 0.3 % (ref 0–8)
ERYTHROCYTE [DISTWIDTH] IN BLOOD BY AUTOMATED COUNT: 14.1 % (ref 11.5–14.5)
ERYTHROCYTE [SEDIMENTATION RATE] IN BLOOD BY WESTERGREN METHOD: 24 MM/HR (ref 0–10)
EST. GFR  (NO RACE VARIABLE): >60 ML/MIN/1.73 M^2
GLUCOSE SERPL-MCNC: 128 MG/DL (ref 70–110)
GLUCOSE UR QL STRIP: NEGATIVE
HCT VFR BLD AUTO: 41.1 % (ref 40–54)
HGB BLD-MCNC: 13.1 G/DL (ref 14–18)
HGB UR QL STRIP: NEGATIVE
IMM GRANULOCYTES # BLD AUTO: 0.17 K/UL (ref 0–0.04)
IMM GRANULOCYTES NFR BLD AUTO: 0.9 % (ref 0–0.5)
INFLUENZA A, MOLECULAR: NEGATIVE
INFLUENZA B, MOLECULAR: NEGATIVE
KETONES UR QL STRIP: NEGATIVE
LACTATE SERPL-SCNC: 1.2 MMOL/L (ref 0.5–2.2)
LACTATE SERPL-SCNC: 1.8 MMOL/L (ref 0.5–2.2)
LACTATE SERPL-SCNC: 3.3 MMOL/L (ref 0.5–2.2)
LEUKOCYTE ESTERASE UR QL STRIP: NEGATIVE
LIPASE SERPL-CCNC: 28 U/L (ref 4–60)
LYMPHOCYTES # BLD AUTO: 1 K/UL (ref 1–4.8)
LYMPHOCYTES NFR BLD: 5.4 % (ref 18–48)
MAGNESIUM SERPL-MCNC: 1.9 MG/DL (ref 1.6–2.6)
MCH RBC QN AUTO: 28.8 PG (ref 27–31)
MCHC RBC AUTO-ENTMCNC: 31.9 G/DL (ref 32–36)
MCV RBC AUTO: 90 FL (ref 82–98)
METHADONE UR QL SCN>300 NG/ML: NEGATIVE
MONOCYTES # BLD AUTO: 1 K/UL (ref 0.3–1)
MONOCYTES NFR BLD: 5.2 % (ref 4–15)
NEUTROPHILS # BLD AUTO: 16.1 K/UL (ref 1.8–7.7)
NEUTROPHILS NFR BLD: 87.8 % (ref 38–73)
NITRITE UR QL STRIP: NEGATIVE
NRBC BLD-RTO: 0 /100 WBC
OHS QRS DURATION: 92 MS
OHS QTC CALCULATION: 474 MS
OPIATES UR QL SCN: NEGATIVE
PCP UR QL SCN>25 NG/ML: NEGATIVE
PH UR STRIP: 6 [PH] (ref 5–8)
PLATELET # BLD AUTO: 359 K/UL (ref 150–450)
PMV BLD AUTO: 9.6 FL (ref 9.2–12.9)
POTASSIUM SERPL-SCNC: 4.5 MMOL/L (ref 3.5–5.1)
PROCALCITONIN SERPL IA-MCNC: 0.04 NG/ML
PROT SERPL-MCNC: 7.2 G/DL (ref 6–8.4)
PROT UR QL STRIP: ABNORMAL
RBC # BLD AUTO: 4.55 M/UL (ref 4.6–6.2)
SARS-COV-2 RDRP RESP QL NAA+PROBE: NEGATIVE
SODIUM SERPL-SCNC: 141 MMOL/L (ref 136–145)
SP GR UR STRIP: 1.01 (ref 1–1.03)
SPECIMEN SOURCE: NORMAL
T4 FREE SERPL-MCNC: 1.06 NG/DL (ref 0.71–1.51)
TOXICOLOGY INFORMATION: NORMAL
TROPONIN I SERPL DL<=0.01 NG/ML-MCNC: <0.006 NG/ML (ref 0–0.03)
TSH SERPL DL<=0.005 MIU/L-ACNC: 0.9 UIU/ML (ref 0.4–4)
URN SPEC COLLECT METH UR: ABNORMAL
UROBILINOGEN UR STRIP-ACNC: NEGATIVE EU/DL
WBC # BLD AUTO: 18.39 K/UL (ref 3.9–12.7)

## 2024-09-24 PROCEDURE — 83735 ASSAY OF MAGNESIUM: CPT | Performed by: INTERNAL MEDICINE

## 2024-09-24 PROCEDURE — 82607 VITAMIN B-12: CPT | Performed by: INTERNAL MEDICINE

## 2024-09-24 PROCEDURE — 83605 ASSAY OF LACTIC ACID: CPT | Mod: 91 | Performed by: EMERGENCY MEDICINE

## 2024-09-24 PROCEDURE — 86140 C-REACTIVE PROTEIN: CPT | Performed by: INTERNAL MEDICINE

## 2024-09-24 PROCEDURE — 93010 ELECTROCARDIOGRAM REPORT: CPT | Mod: ,,, | Performed by: INTERNAL MEDICINE

## 2024-09-24 PROCEDURE — 87502 INFLUENZA DNA AMP PROBE: CPT | Performed by: INTERNAL MEDICINE

## 2024-09-24 PROCEDURE — 80307 DRUG TEST PRSMV CHEM ANLYZR: CPT | Performed by: EMERGENCY MEDICINE

## 2024-09-24 PROCEDURE — 81003 URINALYSIS AUTO W/O SCOPE: CPT | Mod: 59 | Performed by: EMERGENCY MEDICINE

## 2024-09-24 PROCEDURE — 93005 ELECTROCARDIOGRAM TRACING: CPT

## 2024-09-24 PROCEDURE — U0002 COVID-19 LAB TEST NON-CDC: HCPCS | Performed by: INTERNAL MEDICINE

## 2024-09-24 PROCEDURE — 83036 HEMOGLOBIN GLYCOSYLATED A1C: CPT | Performed by: INTERNAL MEDICINE

## 2024-09-24 PROCEDURE — 96365 THER/PROPH/DIAG IV INF INIT: CPT

## 2024-09-24 PROCEDURE — 83690 ASSAY OF LIPASE: CPT | Performed by: EMERGENCY MEDICINE

## 2024-09-24 PROCEDURE — 11000001 HC ACUTE MED/SURG PRIVATE ROOM

## 2024-09-24 PROCEDURE — 84484 ASSAY OF TROPONIN QUANT: CPT | Performed by: EMERGENCY MEDICINE

## 2024-09-24 PROCEDURE — 25000003 PHARM REV CODE 250: Performed by: INTERNAL MEDICINE

## 2024-09-24 PROCEDURE — 83880 ASSAY OF NATRIURETIC PEPTIDE: CPT | Performed by: INTERNAL MEDICINE

## 2024-09-24 PROCEDURE — 84443 ASSAY THYROID STIM HORMONE: CPT | Performed by: INTERNAL MEDICINE

## 2024-09-24 PROCEDURE — 99285 EMERGENCY DEPT VISIT HI MDM: CPT | Mod: 25

## 2024-09-24 PROCEDURE — 82140 ASSAY OF AMMONIA: CPT | Performed by: INTERNAL MEDICINE

## 2024-09-24 PROCEDURE — 96360 HYDRATION IV INFUSION INIT: CPT | Mod: 59

## 2024-09-24 PROCEDURE — 84134 ASSAY OF PREALBUMIN: CPT | Performed by: INTERNAL MEDICINE

## 2024-09-24 PROCEDURE — 85025 COMPLETE CBC W/AUTO DIFF WBC: CPT | Performed by: EMERGENCY MEDICINE

## 2024-09-24 PROCEDURE — 25000003 PHARM REV CODE 250: Performed by: EMERGENCY MEDICINE

## 2024-09-24 PROCEDURE — 84439 ASSAY OF FREE THYROXINE: CPT | Performed by: INTERNAL MEDICINE

## 2024-09-24 PROCEDURE — 87040 BLOOD CULTURE FOR BACTERIA: CPT | Performed by: EMERGENCY MEDICINE

## 2024-09-24 PROCEDURE — 82746 ASSAY OF FOLIC ACID SERUM: CPT | Performed by: INTERNAL MEDICINE

## 2024-09-24 PROCEDURE — 63600175 PHARM REV CODE 636 W HCPCS: Performed by: INTERNAL MEDICINE

## 2024-09-24 PROCEDURE — 85651 RBC SED RATE NONAUTOMATED: CPT | Performed by: INTERNAL MEDICINE

## 2024-09-24 PROCEDURE — 63600175 PHARM REV CODE 636 W HCPCS: Performed by: EMERGENCY MEDICINE

## 2024-09-24 PROCEDURE — 84145 PROCALCITONIN (PCT): CPT | Performed by: EMERGENCY MEDICINE

## 2024-09-24 PROCEDURE — 80053 COMPREHEN METABOLIC PANEL: CPT | Performed by: EMERGENCY MEDICINE

## 2024-09-24 PROCEDURE — 83605 ASSAY OF LACTIC ACID: CPT | Mod: 91 | Performed by: INTERNAL MEDICINE

## 2024-09-24 RX ORDER — HYDROCODONE BITARTRATE AND ACETAMINOPHEN 5; 325 MG/1; MG/1
1 TABLET ORAL EVERY 12 HOURS PRN
COMMUNITY
Start: 2024-09-23 | End: 2024-09-30

## 2024-09-24 RX ORDER — SYRING-NEEDL,DISP,INSUL,0.3 ML 29 G X1/2"
296 SYRINGE, EMPTY DISPOSABLE MISCELLANEOUS
Status: COMPLETED | OUTPATIENT
Start: 2024-09-24 | End: 2024-09-24

## 2024-09-24 RX ORDER — MIRTAZAPINE 7.5 MG/1
7.5 TABLET, FILM COATED ORAL NIGHTLY
COMMUNITY
Start: 2024-09-13

## 2024-09-24 RX ORDER — IBUPROFEN 200 MG
16 TABLET ORAL
Status: DISCONTINUED | OUTPATIENT
Start: 2024-09-24 | End: 2024-09-26 | Stop reason: HOSPADM

## 2024-09-24 RX ORDER — ACETAMINOPHEN 650 MG/1
650 SUPPOSITORY RECTAL EVERY 6 HOURS PRN
Status: DISCONTINUED | OUTPATIENT
Start: 2024-09-24 | End: 2024-09-26 | Stop reason: HOSPADM

## 2024-09-24 RX ORDER — PSEUDOEPHEDRINE/ACETAMINOPHEN 30MG-500MG
100 TABLET ORAL
Status: COMPLETED | OUTPATIENT
Start: 2024-09-24 | End: 2024-09-24

## 2024-09-24 RX ORDER — IBUPROFEN 200 MG
24 TABLET ORAL
Status: DISCONTINUED | OUTPATIENT
Start: 2024-09-24 | End: 2024-09-26 | Stop reason: HOSPADM

## 2024-09-24 RX ORDER — NALOXONE HCL 0.4 MG/ML
0.02 VIAL (ML) INJECTION
Status: DISCONTINUED | OUTPATIENT
Start: 2024-09-24 | End: 2024-09-26 | Stop reason: HOSPADM

## 2024-09-24 RX ORDER — SODIUM CHLORIDE 0.9 % (FLUSH) 0.9 %
10 SYRINGE (ML) INJECTION EVERY 12 HOURS PRN
Status: DISCONTINUED | OUTPATIENT
Start: 2024-09-24 | End: 2024-09-26 | Stop reason: HOSPADM

## 2024-09-24 RX ORDER — ZINC SULFATE 50(220)MG
220 CAPSULE ORAL DAILY
COMMUNITY

## 2024-09-24 RX ORDER — ENOXAPARIN SODIUM 100 MG/ML
40 INJECTION SUBCUTANEOUS EVERY 24 HOURS
Status: DISCONTINUED | OUTPATIENT
Start: 2024-09-24 | End: 2024-09-26 | Stop reason: HOSPADM

## 2024-09-24 RX ORDER — BISACODYL 10 MG/1
10 SUPPOSITORY RECTAL DAILY PRN
Status: DISCONTINUED | OUTPATIENT
Start: 2024-09-24 | End: 2024-09-26 | Stop reason: HOSPADM

## 2024-09-24 RX ORDER — GLUCAGON 1 MG
1 KIT INJECTION
Status: DISCONTINUED | OUTPATIENT
Start: 2024-09-24 | End: 2024-09-26 | Stop reason: HOSPADM

## 2024-09-24 RX ORDER — ONDANSETRON HYDROCHLORIDE 2 MG/ML
4 INJECTION, SOLUTION INTRAVENOUS EVERY 6 HOURS PRN
Status: DISCONTINUED | OUTPATIENT
Start: 2024-09-24 | End: 2024-09-26 | Stop reason: HOSPADM

## 2024-09-24 RX ORDER — SODIUM CHLORIDE, SODIUM LACTATE, POTASSIUM CHLORIDE, CALCIUM CHLORIDE 600; 310; 30; 20 MG/100ML; MG/100ML; MG/100ML; MG/100ML
INJECTION, SOLUTION INTRAVENOUS CONTINUOUS
Status: DISCONTINUED | OUTPATIENT
Start: 2024-09-24 | End: 2024-09-26 | Stop reason: HOSPADM

## 2024-09-24 RX ORDER — ASCORBIC ACID 500 MG
500 TABLET ORAL DAILY
COMMUNITY

## 2024-09-24 RX ORDER — HYDRALAZINE HYDROCHLORIDE 20 MG/ML
10 INJECTION INTRAMUSCULAR; INTRAVENOUS EVERY 6 HOURS PRN
Status: DISCONTINUED | OUTPATIENT
Start: 2024-09-24 | End: 2024-09-26 | Stop reason: HOSPADM

## 2024-09-24 RX ORDER — ACETAMINOPHEN 500 MG
500 TABLET ORAL EVERY 6 HOURS PRN
COMMUNITY

## 2024-09-24 RX ADMIN — MAGNESIUM CITRATE 296 ML: 1.75 LIQUID ORAL at 09:09

## 2024-09-24 RX ADMIN — Medication 100 ML: at 09:09

## 2024-09-24 RX ADMIN — CEFEPIME 2 G: 2 INJECTION, POWDER, FOR SOLUTION INTRAVENOUS at 05:09

## 2024-09-24 RX ADMIN — SODIUM CHLORIDE 1000 ML: 9 INJECTION, SOLUTION INTRAVENOUS at 04:09

## 2024-09-24 RX ADMIN — ENOXAPARIN SODIUM 40 MG: 40 INJECTION SUBCUTANEOUS at 09:09

## 2024-09-24 RX ADMIN — SODIUM CHLORIDE 500 ML: 9 INJECTION, SOLUTION INTRAVENOUS at 09:09

## 2024-09-24 RX ADMIN — VANCOMYCIN HYDROCHLORIDE 2000 MG: 10 INJECTION, POWDER, LYOPHILIZED, FOR SOLUTION INTRAVENOUS at 08:09

## 2024-09-24 RX ADMIN — SODIUM CHLORIDE, POTASSIUM CHLORIDE, SODIUM LACTATE AND CALCIUM CHLORIDE: 600; 310; 30; 20 INJECTION, SOLUTION INTRAVENOUS at 08:09

## 2024-09-24 RX ADMIN — SODIUM CHLORIDE 2502 ML: 9 INJECTION, SOLUTION INTRAVENOUS at 05:09

## 2024-09-24 NOTE — PHARMACY MED REC
"Admission Medication History     The home medication history was taken by Naheed Badillo.    You may go to "Admission" then "Reconcile Home Medications" tabs to review and/or act upon these items.     The home medication list has been updated by the Pharmacy department.   Please read ALL comments highlighted in yellow.   Please address this information as you see fit.    Feel free to contact us if you have any questions or require assistance.      The medications listed below were removed from the home medication list. Please reorder if appropriate:  Patient reports no longer taking the following medication(s):  Melatonin 3 mg        Medications listed below were obtained from: Patient/family and Analytic software- [employer]      [facility] REC COMPLETED:   Naheedsanthosh Badillo  [id number]    Current Outpatient Medications on File Prior to Encounter   Medication Sig Dispense Refill Last Dose    acetaminophen (TYLENOL) 500 MG tablet Take 500 mg by mouth every 6 (six) hours as needed for Pain.   9/23/2024    amLODIPine (NORVASC) 10 MG tablet Take 10 mg by mouth once daily.   9/23/2024    ascorbic acid, vitamin C, (VITAMIN C) 500 MG tablet Take 500 mg by mouth once daily.   9/23/2024    aspirin 81 MG Chew Take 81 mg by mouth once daily.   9/23/2024    divalproex (DEPAKOTE) 125 MG EC tablet Take 125 mg by mouth 2 (two) times daily.   9/23/2024    donepeziL (ARICEPT) 10 MG tablet Take 10 mg by mouth once daily.   9/23/2024    famotidine (PEPCID) 20 MG tablet Take 20 mg by mouth 2 (two) times daily.   9/23/2024    furosemide (LASIX) 20 MG tablet Take 20 mg by mouth daily as needed.   9/23/2024    HYDROcodone-acetaminophen (NORCO) 5-325 mg per tablet Take 1 tablet by mouth every 12 (twelve) hours as needed.   9/23/2024    lactulose (CHRONULAC) 10 gram/15 mL solution Take 30 mLs by mouth once daily.   9/23/2024    memantine (NAMENDA) 10 MG Tab Take 10 mg by mouth 2 (two) times daily.   9/23/2024    mirtazapine (REMERON) 7.5 MG " Tab Take 7.5 mg by mouth every evening.   9/23/2024    risperiDONE (RISPERDAL) 0.5 MG Tab Take 0.5 mg by mouth once daily.   9/23/2024    risperiDONE (RISPERDAL) 1 MG tablet Take 1 mg by mouth every evening.   9/23/2024    senna (SENOKOT) 8.6 mg tablet Take 2 tablets by mouth once daily.   9/23/2024    zinc sulfate (ZINCATE) 50 mg zinc (220 mg) capsule Take 220 mg by mouth once daily.   9/23/2024                           .

## 2024-09-24 NOTE — ED PROVIDER NOTES
SCRIBE #1 NOTE: I, Bouchra Merino, am scribing for, and in the presence of, Bryan Laird Jr., MD. I have scribed the entire note.       History     Chief Complaint   Patient presents with    Loss of Consciousness     Pt. Presents  via [facility] due to being found by nursing home staff unresponsive in his bed. Per EMS pt has a HX of syncopal episodes, pt is awake upon arrival to ED. Pt also has a HX of dementia      Review of patient's allergies indicates:  No Known Allergies      History of Present Illness     HPI    Limited HPI and ROS due to patient's AMS.    9/24/2024, 3:17 PM  History obtained from EMS and pt's son at bedside      History of Present Illness: Gabo Altamirano is a 79 y.o. male patient with a PMHx of HTN, alzheimer's dementia, PTSD, anxiety, Stage 3 CKD, bladder cancer, and gout who presents to the Emergency Department for evaluation of LOC which onset gradually PTA. Per [facility], nursing home staff found pt unresponsive in his bed for about 5 minutes. Upon [facility] arrival on scene, pt was back to baseline and was on NRB at 4 L. Per son, pt's eyes were open while unresponsive. Pt has had numerous episodes like this. Symptoms are constant and moderate in severity. No mitigating or exacerbating factors reported. No further complaints or concerns at this time.  Per the son the patient was altered.  Has a history of dementia and this is how he presents when he has an infection.  Patient was asleep he was difficult to arouse per the nursing home.  He did not have a witnessed syncopal episode.  Patient was denies any complaints at this time other than generalized fatigue and malaise.      Arrival mode: [facility]    PCP: No, Primary Doctor        Past Medical History:  Past Medical History:   Diagnosis Date    Alzheimer's dementia     Anxiety disorder, unspecified     Bladder cancer     CKD (chronic kidney disease) stage 3, GFR 30-59 ml/min     Gout, unspecified     Hypertension     PTSD (post-traumatic  stress disorder)        Past Surgical History:  Past Surgical History:   Procedure Laterality Date    COLONOSCOPY      ESOPHAGOGASTRODUODENOSCOPY      HAND SURGERY Right     JOINT REPLACEMENT           Family History:  Family History   Problem Relation Name Age of Onset    Cancer Mother      Cancer Father      Pancreatitis Father         Social History:  Social History     Tobacco Use    Smoking status: Never    Smokeless tobacco: Former   Substance and Sexual Activity    Alcohol use: Not Currently    Drug use: Not Currently    Sexual activity: Not Currently        Review of Systems     Review of Systems   Reason unable to perform ROS: AMS.   Constitutional:  Positive for fatigue.   Neurological:         (+) loss of conciousness        Physical Exam     Initial Vitals [09/24/24 1507]   BP Pulse Resp Temp SpO2   (!) 135/99 90 18 97.8 °F (36.6 °C) 97 %      MAP       --          Physical Exam  Nursing Notes and Vital Signs Reviewed.  Constitutional: Patient is in no acute distress.  Patient was appears fatigued and ill though not toxic.  He was slow to follow commands but does  Head: Atraumatic. Normocephalic.  Eyes:  EOM intact.  No scleral icterus.  ENT: Mucous membranes are a bit dry.  Nares clear   Neck:  Full ROM. No JVD.  Cardiovascular: Regular rate. Regular rhythm No murmurs, rubs, or gallops. Distal pulses are 2+ and symmetric  Pulmonary/Chest: No respiratory distress. Clear to auscultation bilaterally. No wheezing or rales.  Equal chest wall rise bilaterally  Abdominal: Soft and non-distended.  There is no tenderness.  No rebound, guarding, or rigidity. Good bowel sounds.  Genitourinary: No CVA tenderness.  No suprapubic tenderness  Musculoskeletal: Moves all extremities. No obvious deformities.  5 x 5 strength in all extremities   Skin: Warm and dry.  Decubitus to right heel.  There is a large decubitus to the sacrum as well.  With the covered with gentian violent that appears  "fresh  Neurological:  Alert, awake, but confused.  No focal lateralizing signs noted  Psychiatric:  Unable to assess secondary to his baseline confusion     ED Course   Critical Care    Date/Time: 9/24/2024 4:51 PM    Performed by: Bryan Laird Jr., MD  Authorized by: Bryan Laird Jr., MD  Direct patient critical care time: 10 minutes  Additional history critical care time: 8 minutes  Ordering / reviewing critical care time: 8 minutes  Documentation critical care time: 5 minutes  Consulting other physicians critical care time: 8 minutes  Total critical care time (exclusive of procedural time) : 39 minutes  Critical care time was exclusive of separately billable procedures and treating other patients and teaching time.  Critical care was necessary to treat or prevent imminent or life-threatening deterioration of the following conditions: sepsis.  Critical care was time spent personally by me on the following activities: blood draw for specimens, development of treatment plan with patient or surrogate, discussions with consultants, interpretation of cardiac output measurements, evaluation of patient's response to treatment, examination of patient, obtaining history from patient or surrogate, ordering and performing treatments and interventions, ordering and review of laboratory studies, ordering and review of radiographic studies, pulse oximetry, re-evaluation of patient's condition and review of old charts.      ED Vital Signs:  Vitals:    09/24/24 1507 09/24/24 1555 09/24/24 1556 09/24/24 1700   BP: (!) 135/99  (!) 176/89 (!) 143/80   Pulse: 90 (!) 122 (!) 122 99   Resp: 18  (!) 22 19   Temp: 97.8 °F (36.6 °C)      TempSrc: Axillary      SpO2: 97%  96% 96%   Weight:   83.4 kg (183 lb 13.8 oz)    Height:   5' 10" (1.778 m)     09/24/24 1810   BP: (!) 176/69   Pulse: (!) 116   Resp: 20   Temp:    TempSrc:    SpO2: 97%   Weight:    Height:        Abnormal Lab Results:  Labs Reviewed   CBC W/ AUTO DIFFERENTIAL " - Abnormal       Result Value    WBC 18.39 (*)     RBC 4.55 (*)     Hemoglobin 13.1 (*)     Hematocrit 41.1      MCV 90      MCH 28.8      MCHC 31.9 (*)     RDW 14.1      Platelets 359      MPV 9.6      Immature Granulocytes 0.9 (*)     Gran # (ANC) 16.1 (*)     Immature Grans (Abs) 0.17 (*)     Lymph # 1.0      Mono # 1.0      Eos # 0.1      Baso # 0.08      nRBC 0      Gran % 87.8 (*)     Lymph % 5.4 (*)     Mono % 5.2      Eosinophil % 0.3      Basophil % 0.4      Differential Method Automated     COMPREHENSIVE METABOLIC PANEL - Abnormal    Sodium 141      Potassium 4.5      Chloride 107      CO2 23      Glucose 128 (*)     BUN 12      Creatinine 1.1      Calcium 10.0      Total Protein 7.2      Albumin 3.1 (*)     Total Bilirubin 0.4      Alkaline Phosphatase 92      AST 23      ALT 13      eGFR >60      Anion Gap 11     LACTIC ACID, PLASMA - Abnormal    Lactate (Lactic Acid) 3.3 (*)    URINALYSIS, REFLEX TO URINE CULTURE - Abnormal    Specimen UA Urine, Clean Catch      Color, UA Yellow      Appearance, UA Clear      pH, UA 6.0      Specific Gravity, UA 1.015      Protein, UA Trace (*)     Glucose, UA Negative      Ketones, UA Negative      Bilirubin (UA) Negative      Occult Blood UA Negative      Nitrite, UA Negative      Urobilinogen, UA Negative      Leukocytes, UA Negative      Narrative:     Specimen Source->Urine   CULTURE, BLOOD   CULTURE, BLOOD   LIPASE    Lipase 28     TROPONIN I    Troponin I <0.006     PROCALCITONIN    Procalcitonin 0.04     LACTIC ACID, PLASMA        All Lab Results:  Results for orders placed or performed during the hospital encounter of 09/24/24   CBC auto differential   Result Value Ref Range    WBC 18.39 (H) 3.90 - 12.70 K/uL    RBC 4.55 (L) 4.60 - 6.20 M/uL    Hemoglobin 13.1 (L) 14.0 - 18.0 g/dL    Hematocrit 41.1 40.0 - 54.0 %    MCV 90 82 - 98 fL    MCH 28.8 27.0 - 31.0 pg    MCHC 31.9 (L) 32.0 - 36.0 g/dL    RDW 14.1 11.5 - 14.5 %    Platelets 359 150 - 450 K/uL    MPV 9.6  9.2 - 12.9 fL    Immature Granulocytes 0.9 (H) 0.0 - 0.5 %    Gran # (ANC) 16.1 (H) 1.8 - 7.7 K/uL    Immature Grans (Abs) 0.17 (H) 0.00 - 0.04 K/uL    Lymph # 1.0 1.0 - 4.8 K/uL    Mono # 1.0 0.3 - 1.0 K/uL    Eos # 0.1 0.0 - 0.5 K/uL    Baso # 0.08 0.00 - 0.20 K/uL    nRBC 0 0 /100 WBC    Gran % 87.8 (H) 38.0 - 73.0 %    Lymph % 5.4 (L) 18.0 - 48.0 %    Mono % 5.2 4.0 - 15.0 %    Eosinophil % 0.3 0.0 - 8.0 %    Basophil % 0.4 0.0 - 1.9 %    Differential Method Automated    Comprehensive metabolic panel   Result Value Ref Range    Sodium 141 136 - 145 mmol/L    Potassium 4.5 3.5 - 5.1 mmol/L    Chloride 107 95 - 110 mmol/L    CO2 23 23 - 29 mmol/L    Glucose 128 (H) 70 - 110 mg/dL    BUN 12 8 - 23 mg/dL    Creatinine 1.1 0.5 - 1.4 mg/dL    Calcium 10.0 8.7 - 10.5 mg/dL    Total Protein 7.2 6.0 - 8.4 g/dL    Albumin 3.1 (L) 3.5 - 5.2 g/dL    Total Bilirubin 0.4 0.1 - 1.0 mg/dL    Alkaline Phosphatase 92 55 - 135 U/L    AST 23 10 - 40 U/L    ALT 13 10 - 44 U/L    eGFR >60 >60 mL/min/1.73 m^2    Anion Gap 11 8 - 16 mmol/L   Lipase   Result Value Ref Range    Lipase 28 4 - 60 U/L   Lactic acid, plasma   Result Value Ref Range    Lactate (Lactic Acid) 3.3 (H) 0.5 - 2.2 mmol/L   Urinalysis, Reflex to Urine Culture Urine, Clean Catch    Specimen: Urine, Clean Catch   Result Value Ref Range    Specimen UA Urine, Clean Catch     Color, UA Yellow Yellow, Straw, Yadira    Appearance, UA Clear Clear    pH, UA 6.0 5.0 - 8.0    Specific Gravity, UA 1.015 1.005 - 1.030    Protein, UA Trace (A) Negative    Glucose, UA Negative Negative    Ketones, UA Negative Negative    Bilirubin (UA) Negative Negative    Occult Blood UA Negative Negative    Nitrite, UA Negative Negative    Urobilinogen, UA Negative <2.0 EU/dL    Leukocytes, UA Negative Negative   Troponin I   Result Value Ref Range    Troponin I <0.006 0.000 - 0.026 ng/mL   Procalcitonin   Result Value Ref Range    Procalcitonin 0.04 <0.25 ng/mL   EKG 12-lead   Result Value Ref  Range    QRS Duration 92 ms    [facility] QTC Calculation 474 ms         Imaging Results:  Imaging Results              CT Abdomen Pelvis  Without Contrast (Final result)  Result time 09/24/24 18:31:21      Final result by Brandon Hernandez MD (09/24/24 18:31:21)                   Impression:      No definite acute abnormality identified.    Constipation versus impaction.    Complete findings as above.    All CT scans at this facility are performed  using dose modulation techniques as appropriate to performed exam including the following:  automated exposure control; adjustment of mA and/or kV according to the patients size (this includes techniques or standardized protocols for targeted exams where dose is matched to indication/reason for exam: i.e. extremities or head);  iterative reconstruction technique.      Electronically signed by: Brandon Hernandez  Date:    09/24/2024  Time:    18:31               Narrative:    EXAMINATION:  CT ABDOMEN PELVIS WITHOUT CONTRAST    CLINICAL HISTORY:  Sepsis;    TECHNIQUE:  Low dose axial images, sagittal and coronal reformations were obtained from the lung bases to the pubic symphysis.  Contrast was not administered.    COMPARISON:  None    FINDINGS:  Heart: Normal in size. No pericardial effusion.    Lung Bases: Well aerated, without consolidation or pleural fluid.    Liver: Normal in size and attenuation, with no focal hepatic lesions.    Gallbladder: No calcified gallstones.    Bile Ducts: No evidence of dilated ducts.    Pancreas: No mass or peripancreatic fat stranding.    Spleen: Unremarkable.    Adrenals: Unremarkable.    Kidneys/ Ureters: No hydronephrosis.  Nonobstructive nephrolithiasis bilaterally.    Bladder: No evidence of wall thickening.    Reproductive organs: Unremarkable.    GI Tract/Mesentery: No evidence of bowel obstruction or inflammation.  Increased rectal stool burden concerning for constipation or impaction.  Correlation is advised.    Peritoneal Space: No  ascites. No free air.    Retroperitoneum: No significant adenopathy.    Abdominal wall: Mild soft tissue swelling along the right hip of unclear significance.  Correlation is advised.    Vasculature: No significant atherosclerosis or aneurysm.    Bones: No acute fracture.                                       CT Head Without Contrast (Final result)  Result time 09/24/24 15:50:51      Final result by Lex Fraga MD (09/24/24 15:50:51)                   Impression:      1.  Negative for acute intracranial process. Negative for hemorrhage, or skull fracture.    2.  Marked cerebral atrophy noted.  Intracranial atherosclerotic disease noted.  Small vessel ischemic changes noted.    3.  Debris within both ear canals.    All CT scans at this facility are performed  using dose modulation techniques as appropriate to performed exam including the following:  automated exposure control; adjustment of mA and/or kV according to the patients size (this includes techniques or standardized protocols for targeted exams where dose is matched to indication/reason for exam: i.e. extremities or head);  iterative reconstruction technique.      Electronically signed by: Lex Fraga MD  Date:    09/24/2024  Time:    15:50               Narrative:    EXAMINATION:  CT HEAD WITHOUT CONTRAST    CLINICAL HISTORY:  Mental status change, unknown cause;    TECHNIQUE:  Axial images through the brain and posterior fossa were obtained without the use of IV contrast.  Sagittal and coronal reconstructions are provided for review.  Motion artifact is present on a number of images.  Subtle abnormalities could be overlooked.    COMPARISON:  July 15, 2024    FINDINGS:  The ventricles are midline and the CSF spaces are markedly prominent.  The gray-white matter junction is well preserved. Negative for intracranial vascular abnormalities. Negative for mass, mass effect, cerebral edema, hemorrhage or abnormal fluid collections.  Intracranial  atherosclerotic disease noted.  Small vessel ischemic changes noted.    The skull and scalp are  intact.  Rightward nasal septal deviation.  Debris within the left greater than right ear canals.  The rest of the paranasal sinuses, mastoid air cells, middle ears and ear canals are clear. The globes are intact.                                       X-Ray Chest AP Portable (Final result)  Result time 09/24/24 15:37:34      Final result by Lex Fraga MD (09/24/24 15:37:34)                   Impression:      1.  Negative for acute process involving the chest.    2.  Stable findings as noted above.      Electronically signed by: Lex Fraga MD  Date:    09/24/2024  Time:    15:37               Narrative:    EXAMINATION:  XR CHEST AP PORTABLE    CLINICAL HISTORY:  altered mental status;    COMPARISON:  July 15, 2024    FINDINGS:  Mild basilar interstitial changes again seen.  Oxygen tubing overlies the chest.  The lungs are clear. The cardiac silhouette size is normal. The trachea is midline and the mediastinal width is normal. Negative for focal infiltrate, effusion or pneumothorax. Pulmonary vasculature is normal. Negative for osseous abnormalities. Ectatic and tortuous aorta with aortic arch calcifications.  Degenerative changes of the spine and both shoulder girdles.                                       The EKG was ordered, reviewed, and independently interpreted by the ED provider.  Interpretation time: 15:34  Rate: 121 BPM  Rhythm: sinus tachycardia with premature atrial complexes  Interpretation: Cannot rule out anterior infarct, age undetermined. No STEMI.             The Emergency Provider reviewed the vital signs and test results, which are outlined above.     ED Discussion     6:43 PM: Discussed case with Wendy Garcia NP ([facility]). Dr. Andrade agrees with current care and management of pt and accepts admission.   Admitting Service:   Admitting Physician: Dr. Andrade  Admit to: Inpatient Med  Tele     6:43 PM: Re-evaluated pt. I have discussed test results, shared treatment plan, and the need for admission with patient and family at bedside. Pt and family express understanding at this time and agree with all information. All questions answered. Pt and family have no further questions or concerns at this time. Pt is ready for admit.     ED Course as of 09/24/24 1904   Tue Sep 24, 2024   1638 Cardiac monitor interpretation  Independent interpretation  Indication: Altered mental status  Normal sinus rhythm.  Rate 122.  No STEMI [RT]   1651 Sepsis criteria met at this time.  Lactic acid is elevated in light of his tachycardia.  This is a time of SIRS criteria.  Sepsis orders initiated along with sepsis fluids and antibiotics [RT]   1823 6:23 PM: 30mL/kg IVF have been administered within 3 hours of identification of SIRS criteria. Vital signs were reviewed and a focal sepsis perfusion assessment was performed.  Cardiopulmonary exam unchanged.  Skin is warm to touch and non mottled.  Peripheral pulses are 2+ and symmetrical.  Cap refill less than 2 seconds     [RT]      ED Course User Index  [RT] Bryan Laird Jr., MD     Medical Decision Making  Differential diagnosis: Syncope, NSTEMI, STEMI, cardiac ischemia, altered mental status, confusion, UTI, pneumonia, electrolyte abnormality    Patient was evaluated history physical examination.  Patient was decubitus ulcer right heel as well as buttock.  He was severe sepsis with elevated lactate.  Patient was received IV fluids.  Cardiopulmonary exam was re-evaluated this was sepsis re-evaluation.  Full evaluation shows no obvious source of infection she was likely related to his decubitus.  Patient was being admitted to Hospital Medicine further evaluation and treatment    Amount and/or Complexity of Data Reviewed  Independent Historian: caregiver and EMS     Details: EMS and some provided significant history regarding the patient's  External Data Reviewed:  labs and notes.  Labs: ordered. Decision-making details documented in ED Course.     Details: Procalcitonin normal.  UA negative.  Troponin undetectable.  Lipase is normal.  CMP shows a glucose of 128 but otherwise benign.  Lactate is elevated at 3.3.  He was 18.4 white count with left shift and bands.  Radiology: ordered. Decision-making details documented in ED Course.     Details: Chest x-ray clear.  Head CT negative.  Abdominal pelvis exam shows some mild swelling to the right hip otherwise benign  ECG/medicine tests: ordered and independent interpretation performed. Decision-making details documented in ED Course.     Details: No STEMI  Discussion of management or test interpretation with external provider(s): Discussed case with hospital medicine graciously accepts the patient for admission    Risk  OTC drugs.  Prescription drug management.  Decision regarding hospitalization.  Diagnosis or treatment significantly limited by social determinants of health.  Risk Details: Social determinants: Patient was a nursing home resident with a baseline dementia    Critical Care  Total time providing critical care: 39 minutes              ED Medication(s):  Medications   sodium chloride 0.9% bolus 1,000 mL 1,000 mL (0 mLs Intravenous Stopped 9/24/24 1757)   sodium chloride 0.9% bolus 2,502 mL 2,502 mL (0 mLs Intravenous Stopped 9/24/24 1843)   ceFEPIme (MAXIPIME) 2 g in D5W 100 mL IVPB (MB+) (0 g Intravenous Stopped 9/24/24 1724)       New Prescriptions    No medications on file               Scribe Attestation:   Scribe #1: I performed the above scribed service and the documentation accurately describes the services I performed. I attest to the accuracy of the note.     Attending:   Physician Attestation Statement for Scribe #1: I, Bryan Laird Jr., MD, personally performed the services described in this documentation, as scribed by Bouchra Merino, in my presence, and it is both accurate and complete.            Clinical Impression       ICD-10-CM ICD-9-CM   1. Severe sepsis  A41.9 038.9    R65.20 995.92   2. Altered mental status  R41.82 780.97   3. Pressure injury of skin of sacral region, unspecified injury stage  L89.159 707.03     707.20   4. Pressure injury of skin of right heel, unspecified injury stage  L89.619 707.07     707.20       Disposition:   Disposition: Admitted  Condition: Stable        Bryan Laird Jr., MD  09/24/24 1905       Bryan Laird Jr., MD  09/24/24 1905

## 2024-09-25 LAB
ANION GAP SERPL CALC-SCNC: 8 MMOL/L (ref 8–16)
BASOPHILS # BLD AUTO: 0.1 K/UL (ref 0–0.2)
BASOPHILS NFR BLD: 0.9 % (ref 0–1.9)
BUN SERPL-MCNC: 8 MG/DL (ref 8–23)
CALCIUM SERPL-MCNC: 8.5 MG/DL (ref 8.7–10.5)
CHLORIDE SERPL-SCNC: 113 MMOL/L (ref 95–110)
CO2 SERPL-SCNC: 22 MMOL/L (ref 23–29)
CREAT SERPL-MCNC: 0.7 MG/DL (ref 0.5–1.4)
CREAT SERPL-MCNC: 0.7 MG/DL (ref 0.5–1.4)
DIFFERENTIAL METHOD BLD: ABNORMAL
EOSINOPHIL # BLD AUTO: 0.2 K/UL (ref 0–0.5)
EOSINOPHIL NFR BLD: 2 % (ref 0–8)
ERYTHROCYTE [DISTWIDTH] IN BLOOD BY AUTOMATED COUNT: 13.9 % (ref 11.5–14.5)
EST. GFR  (NO RACE VARIABLE): >60 ML/MIN/1.73 M^2
EST. GFR  (NO RACE VARIABLE): >60 ML/MIN/1.73 M^2
ESTIMATED AVG GLUCOSE: 94 MG/DL (ref 68–131)
FOLATE SERPL-MCNC: 3.6 NG/ML (ref 4–24)
GLUCOSE SERPL-MCNC: 91 MG/DL (ref 70–110)
HBA1C MFR BLD: 4.9 % (ref 4–5.6)
HCT VFR BLD AUTO: 37.7 % (ref 40–54)
HGB BLD-MCNC: 11.7 G/DL (ref 14–18)
IMM GRANULOCYTES # BLD AUTO: 0.06 K/UL (ref 0–0.04)
IMM GRANULOCYTES NFR BLD AUTO: 0.5 % (ref 0–0.5)
LACTATE SERPL-SCNC: 1.3 MMOL/L (ref 0.5–2.2)
LYMPHOCYTES # BLD AUTO: 1.6 K/UL (ref 1–4.8)
LYMPHOCYTES NFR BLD: 14.6 % (ref 18–48)
MCH RBC QN AUTO: 28.2 PG (ref 27–31)
MCHC RBC AUTO-ENTMCNC: 31 G/DL (ref 32–36)
MCV RBC AUTO: 91 FL (ref 82–98)
MONOCYTES # BLD AUTO: 1 K/UL (ref 0.3–1)
MONOCYTES NFR BLD: 8.8 % (ref 4–15)
NEUTROPHILS # BLD AUTO: 8 K/UL (ref 1.8–7.7)
NEUTROPHILS NFR BLD: 73.2 % (ref 38–73)
NRBC BLD-RTO: 0 /100 WBC
PLATELET # BLD AUTO: 316 K/UL (ref 150–450)
PMV BLD AUTO: 9.5 FL (ref 9.2–12.9)
POCT GLUCOSE: 110 MG/DL (ref 70–110)
POCT GLUCOSE: 90 MG/DL (ref 70–110)
POTASSIUM SERPL-SCNC: 3.5 MMOL/L (ref 3.5–5.1)
PREALB SERPL-MCNC: 11 MG/DL (ref 20–43)
RBC # BLD AUTO: 4.15 M/UL (ref 4.6–6.2)
SODIUM SERPL-SCNC: 143 MMOL/L (ref 136–145)
VIT B12 SERPL-MCNC: 388 PG/ML (ref 210–950)
WBC # BLD AUTO: 11 K/UL (ref 3.9–12.7)

## 2024-09-25 PROCEDURE — 25000003 PHARM REV CODE 250: Performed by: INTERNAL MEDICINE

## 2024-09-25 PROCEDURE — 63600175 PHARM REV CODE 636 W HCPCS: Performed by: FAMILY MEDICINE

## 2024-09-25 PROCEDURE — 25000003 PHARM REV CODE 250: Performed by: FAMILY MEDICINE

## 2024-09-25 PROCEDURE — 92610 EVALUATE SWALLOWING FUNCTION: CPT

## 2024-09-25 PROCEDURE — 97163 PT EVAL HIGH COMPLEX 45 MIN: CPT

## 2024-09-25 PROCEDURE — 97166 OT EVAL MOD COMPLEX 45 MIN: CPT

## 2024-09-25 PROCEDURE — 11000001 HC ACUTE MED/SURG PRIVATE ROOM

## 2024-09-25 PROCEDURE — 36415 COLL VENOUS BLD VENIPUNCTURE: CPT | Performed by: INTERNAL MEDICINE

## 2024-09-25 PROCEDURE — 63600175 PHARM REV CODE 636 W HCPCS: Performed by: INTERNAL MEDICINE

## 2024-09-25 PROCEDURE — 80048 BASIC METABOLIC PNL TOTAL CA: CPT | Performed by: FAMILY MEDICINE

## 2024-09-25 PROCEDURE — 97530 THERAPEUTIC ACTIVITIES: CPT

## 2024-09-25 PROCEDURE — 92523 SPEECH SOUND LANG COMPREHEN: CPT

## 2024-09-25 PROCEDURE — 85025 COMPLETE CBC W/AUTO DIFF WBC: CPT | Performed by: FAMILY MEDICINE

## 2024-09-25 PROCEDURE — 36415 COLL VENOUS BLD VENIPUNCTURE: CPT | Performed by: FAMILY MEDICINE

## 2024-09-25 PROCEDURE — 83605 ASSAY OF LACTIC ACID: CPT | Performed by: INTERNAL MEDICINE

## 2024-09-25 RX ORDER — MIRTAZAPINE 7.5 MG/1
7.5 TABLET, FILM COATED ORAL NIGHTLY
Status: DISCONTINUED | OUTPATIENT
Start: 2024-09-25 | End: 2024-09-26 | Stop reason: HOSPADM

## 2024-09-25 RX ORDER — MEMANTINE HYDROCHLORIDE 10 MG/1
10 TABLET ORAL 2 TIMES DAILY
Status: DISCONTINUED | OUTPATIENT
Start: 2024-09-25 | End: 2024-09-26 | Stop reason: HOSPADM

## 2024-09-25 RX ORDER — NAPROXEN SODIUM 220 MG/1
81 TABLET, FILM COATED ORAL DAILY
Status: DISCONTINUED | OUTPATIENT
Start: 2024-09-26 | End: 2024-09-26 | Stop reason: HOSPADM

## 2024-09-25 RX ORDER — RISPERIDONE 1 MG/1
1 TABLET ORAL NIGHTLY
Status: DISCONTINUED | OUTPATIENT
Start: 2024-09-25 | End: 2024-09-26 | Stop reason: HOSPADM

## 2024-09-25 RX ORDER — AMLODIPINE BESYLATE 10 MG/1
10 TABLET ORAL DAILY
Status: DISCONTINUED | OUTPATIENT
Start: 2024-09-26 | End: 2024-09-26 | Stop reason: HOSPADM

## 2024-09-25 RX ORDER — SYRING-NEEDL,DISP,INSUL,0.3 ML 29 G X1/2"
296 SYRINGE, EMPTY DISPOSABLE MISCELLANEOUS ONCE
Status: COMPLETED | OUTPATIENT
Start: 2024-09-25 | End: 2024-09-25

## 2024-09-25 RX ORDER — RISPERIDONE 0.25 MG/1
0.5 TABLET ORAL DAILY
Status: DISCONTINUED | OUTPATIENT
Start: 2024-09-26 | End: 2024-09-26 | Stop reason: HOSPADM

## 2024-09-25 RX ORDER — DONEPEZIL HYDROCHLORIDE 5 MG/1
10 TABLET, FILM COATED ORAL DAILY
Status: DISCONTINUED | OUTPATIENT
Start: 2024-09-26 | End: 2024-09-26 | Stop reason: HOSPADM

## 2024-09-25 RX ORDER — DIVALPROEX SODIUM 125 MG/1
125 CAPSULE, COATED PELLETS ORAL 2 TIMES DAILY
Status: DISCONTINUED | OUTPATIENT
Start: 2024-09-25 | End: 2024-09-26 | Stop reason: HOSPADM

## 2024-09-25 RX ADMIN — ENOXAPARIN SODIUM 40 MG: 40 INJECTION SUBCUTANEOUS at 04:09

## 2024-09-25 RX ADMIN — Medication 1 ENEMA: at 04:09

## 2024-09-25 RX ADMIN — CEFEPIME 2 G: 2 INJECTION, POWDER, FOR SOLUTION INTRAVENOUS at 09:09

## 2024-09-25 RX ADMIN — DIVALPROEX SODIUM 125 MG: 125 CAPSULE, COATED PELLETS ORAL at 08:09

## 2024-09-25 RX ADMIN — VANCOMYCIN HYDROCHLORIDE 1250 MG: 1.25 INJECTION, POWDER, LYOPHILIZED, FOR SOLUTION INTRAVENOUS at 10:09

## 2024-09-25 RX ADMIN — SODIUM CHLORIDE, POTASSIUM CHLORIDE, SODIUM LACTATE AND CALCIUM CHLORIDE: 600; 310; 30; 20 INJECTION, SOLUTION INTRAVENOUS at 09:09

## 2024-09-25 RX ADMIN — RISPERIDONE 1 MG: 1 TABLET, FILM COATED ORAL at 08:09

## 2024-09-25 RX ADMIN — CEFEPIME 2 G: 2 INJECTION, POWDER, FOR SOLUTION INTRAVENOUS at 05:09

## 2024-09-25 RX ADMIN — SODIUM CHLORIDE, POTASSIUM CHLORIDE, SODIUM LACTATE AND CALCIUM CHLORIDE: 600; 310; 30; 20 INJECTION, SOLUTION INTRAVENOUS at 10:09

## 2024-09-25 RX ADMIN — MAGNESIUM CITRATE 296 ML: 1.75 LIQUID ORAL at 04:09

## 2024-09-25 RX ADMIN — CEFEPIME 2 G: 2 INJECTION, POWDER, FOR SOLUTION INTRAVENOUS at 01:09

## 2024-09-25 RX ADMIN — MEMANTINE 10 MG: 10 TABLET ORAL at 08:09

## 2024-09-25 RX ADMIN — MIRTAZAPINE 7.5 MG: 7.5 TABLET, FILM COATED ORAL at 08:09

## 2024-09-25 NOTE — ASSESSMENT & PLAN NOTE
"This patient does have evidence of infective focus  My overall impression is sepsis.  Source: Skin and Soft Tissue (location multiple decubitus ulcers to sacrum and bilateral heels)  Antibiotics given-   Antibiotics (72h ago, onward)      Start     Stop Route Frequency Ordered    09/25/24 0130  ceFEPIme (MAXIPIME) 2 g in D5W 100 mL IVPB (MB+)         -- IV Every 8 hours (non-standard times) 09/24/24 1950    09/24/24 2100  vancomycin 2 g in dextrose 5 % 500 mL IVPB         -- IV Once 09/24/24 1955 09/24/24 2037  vancomycin - pharmacy to dose  (vancomycin IVPB (PEDS and ADULTS))        Placed in "And" Linked Group    -- IV pharmacy to manage frequency 09/24/24 1950          Latest lactate reviewed-  Recent Labs   Lab 09/24/24  1852   LACTATE 1.8     Organ dysfunction not present    Fluid challenge Not needed - patient is not hypotensive      Post- resuscitation assessment No - Post resuscitation assessment not needed       Will Not start Pressors- Levophed for MAP of 65  Source control achieved by:  Patient received 3502 mL normal saline IV fluid bolus ordered by the emergency department physician.  Continue IV cefepime and IV vancomycin.  "

## 2024-09-25 NOTE — ASSESSMENT & PLAN NOTE
-Currently NPO with IV fluids  -give brown bomb enema x1 dose now, manually disimpact if needed  -p.r.n. Dulcolax suppositories  -check thyroid studies  -check KUB in a.m.     Stable

## 2024-09-25 NOTE — HPI
79-year-old white man nursing home resident with history of near-syncope, dehydration, Alzheimer's dementia, UTI, gout, hypertension, chronic kidney disease stage IIIA, PTSD, generalized anxiety disorder, and bladder cancer who was sent for confusion over the last 1-2 days with difficulty arousing the patient today.  Symptoms are constant, moderate severity, and with no aggravating or alleviating factors identified.  Patient has had no witnessed vomiting or diarrhea.  Patient is currently alert and oriented x 0.  All information was obtained from chart review and ER physician.  He is arousable to voice and sternal rub.  Abnormal labs include white blood cell count 18.39, lactic acid level 3.3, glucose 128.  Chest x-ray, CT scan of abdomen and pelvis, CT scan of head, and urinalysis with no localizing source of infection identified.  Patient does have sacral decubitus ulcer and bilateral heel decubiti (right greater than left) as the only source of infection at this time.  He is currently afebrile.    Last hospital admit 7/15-07/17/2024 for hypotension, dehydration, near syncope, and UTI.

## 2024-09-25 NOTE — ASSESSMENT & PLAN NOTE
Check A1c.  Blood glucose checks q.6 hours with general hypoglycemia protocol.  No sliding scale insulin at this time.     WDL

## 2024-09-25 NOTE — H&P
[facility] - Emergency Dept.  [facility]  History & Physical    Patient Name: Gabo Altamirano  MRN: [id number]  Patient Class: IP- Inpatient  Admission Date: 9/24/2024  Attending Physician:  Lisa Andrade MD  Primary Care Provider: [facility]         Patient information was obtained from ER records and ER physician .     Subjective:     Principal Problem:Acute metabolic encephalopathy    Chief Complaint:   Chief Complaint   Patient presents with    Loss of Consciousness     Pt. Presents  via [facility] due to being found by nursing home staff unresponsive in his bed. Per EMS pt has a HX of syncopal episodes, pt is awake upon arrival to ED. Pt also has a HX of dementia         HPI: 79-year-old white man nursing home resident with history of near-syncope, dehydration, Alzheimer's dementia, UTI, gout, hypertension, chronic kidney disease stage IIIA, PTSD, generalized anxiety disorder, and bladder cancer who was sent for confusion over the last 1-2 days with difficulty arousing the patient today.  Symptoms are constant, moderate severity, and with no aggravating or alleviating factors identified.  Patient has had no witnessed vomiting or diarrhea.  Patient is currently alert and oriented x 0.  All information was obtained from chart review and ER physician.  He is arousable to voice and sternal rub.  Abnormal labs include white blood cell count 18.39, lactic acid level 3.3, glucose 128.  Chest x-ray, CT scan of abdomen and pelvis, CT scan of head, and urinalysis with no localizing source of infection identified.  Patient does have sacral decubitus ulcer and bilateral heel decubiti (right greater than left) as the only source of infection at this time.  He is currently afebrile.    Last hospital admit 7/15-07/17/2024 for hypotension, dehydration, near syncope, and UTI.    Past Medical History:   Diagnosis Date    Alzheimer's dementia     Anxiety disorder, unspecified     Bladder cancer     CKD (chronic kidney  disease) stage 3, GFR 30-59 ml/min     Gout, unspecified     Hypertension     PTSD (post-traumatic stress disorder)        Past Surgical History:   Procedure Laterality Date    COLONOSCOPY      ESOPHAGOGASTRODUODENOSCOPY      HAND SURGERY Right     JOINT REPLACEMENT         Review of patient's allergies indicates:  No Known Allergies    No current facility-administered medications on file prior to encounter.     Current Outpatient Medications on File Prior to Encounter   Medication Sig    acetaminophen (TYLENOL) 500 MG tablet Take 500 mg by mouth every 6 (six) hours as needed for Pain.    amLODIPine (NORVASC) 10 MG tablet Take 10 mg by mouth once daily.    ascorbic acid, vitamin C, (VITAMIN C) 500 MG tablet Take 500 mg by mouth once daily.    aspirin 81 MG Chew Take 81 mg by mouth once daily.    divalproex (DEPAKOTE) 125 MG EC tablet Take 125 mg by mouth 2 (two) times daily.    donepeziL (ARICEPT) 10 MG tablet Take 10 mg by mouth once daily.    famotidine (PEPCID) 20 MG tablet Take 20 mg by mouth 2 (two) times daily.    furosemide (LASIX) 20 MG tablet Take 20 mg by mouth daily as needed.    HYDROcodone-acetaminophen (NORCO) 5-325 mg per tablet Take 1 tablet by mouth every 12 (twelve) hours as needed.    lactulose (CHRONULAC) 10 gram/15 mL solution Take 30 mLs by mouth once daily.    memantine (NAMENDA) 10 MG Tab Take 10 mg by mouth 2 (two) times daily.    mirtazapine (REMERON) 7.5 MG Tab Take 7.5 mg by mouth every evening.    risperiDONE (RISPERDAL) 0.5 MG Tab Take 0.5 mg by mouth once daily.    risperiDONE (RISPERDAL) 1 MG tablet Take 1 mg by mouth every evening.    senna (SENOKOT) 8.6 mg tablet Take 2 tablets by mouth once daily.    zinc sulfate (ZINCATE) 50 mg zinc (220 mg) capsule Take 220 mg by mouth once daily.    [DISCONTINUED] melatonin (MELATIN) 3 mg tablet Take 3 mg by mouth nightly.     Family History       Problem Relation (Age of Onset)    Cancer Mother, Father    Pancreatitis Father          Tobacco  Use    Smoking status: Never    Smokeless tobacco: Former   Substance and Sexual Activity    Alcohol use: Not Currently    Drug use: Not Currently    Sexual activity: Not Currently     Review of Systems   Unable to perform ROS: Dementia     Objective:     Vital Signs (Most Recent):  Temp: 98.4 °F (36.9 °C) (09/24/24 1900)  Pulse: 109 (09/24/24 1900)  Resp: 20 (09/24/24 1900)  BP: 130/89 (09/24/24 1900)  SpO2: 97 % (09/24/24 1900) Vital Signs (24h Range):  Temp:  [97.8 °F (36.6 °C)-98.4 °F (36.9 °C)] 98.4 °F (36.9 °C)  Pulse:  [] 109  Resp:  [18-22] 20  SpO2:  [96 %-97 %] 97 %  BP: (130-176)/(69-99) 130/89     Weight: 83.4 kg (183 lb 13.8 oz)  Body mass index is 26.38 kg/m².     Physical Exam  Vitals reviewed.   Constitutional:       Appearance: He is ill-appearing. He is not diaphoretic.   HENT:      Nose: Nose normal.   Eyes:      Conjunctiva/sclera: Conjunctivae normal.   Cardiovascular:      Rate and Rhythm: Tachycardia present.   Pulmonary:      Effort: Pulmonary effort is normal. No respiratory distress.   Abdominal:      General: There is distension.      Tenderness: There is no abdominal tenderness.   Musculoskeletal:      Right lower leg: Edema present.      Left lower leg: Edema present.   Neurological:      Mental Status: He is alert. He is disoriented.   Psychiatric:      Comments: Lethargic, arousable                Significant Labs: All pertinent labs within the past 24 hours have been reviewed.  Recent Lab Results  (Last 5 results in the past 24 hours)        09/24/24  1852   09/24/24  1714   09/24/24  1640   09/24/24  1620   09/24/24  1534        Procalcitonin   0.04  Comment: A concentration < 0.25 ng/mL represents a low risk of bacterial   infection.  Procalcitonin may not be accurate among patients with localized   infection, recent trauma or major surgery, immunosuppressed state,   invasive fungal infection, renal dysfunction. Decisions regarding   initiation or continuation of antibiotic  therapy should not be based   solely on procalcitonin levels.               Albumin       3.1         ALP       92         ALT       13         Anion Gap       11         Appearance, UA     Clear           AST       23         Baso #       0.08         Basophil %       0.4         Bilirubin (UA)     Negative           BILIRUBIN TOTAL       0.4  Comment: For infants and newborns, interpretation of results should be based  on gestational age, weight and in agreement with clinical  observations.    Premature Infant recommended reference ranges:  Up to 24 hours.............<8.0 mg/dL  Up to 48 hours............<12.0 mg/dL  3-5 days..................<15.0 mg/dL  6-29 days.................<15.0 mg/dL           BUN       12         Calcium       10.0         Chloride       107         CO2       23         Color, UA     Yellow           Creatinine       1.1         Differential Method       Automated         eGFR       >60         Eos #       0.1         Eos %       0.3         Glucose       128         Glucose, UA     Negative           Gran # (ANC)       16.1         Gran %       87.8         Hematocrit       41.1         Hemoglobin       13.1         Immature Grans (Abs)       0.17  Comment: Mild elevation in immature granulocytes is non specific and   can be seen in a variety of conditions including stress response,   acute inflammation, trauma and pregnancy. Correlation with other   laboratory and clinical findings is essential.           Immature Granulocytes       0.9         Ketones, UA     Negative           Lactic Acid Level 1.8  Comment: Falsely low lactic acid results can be found in samples   containing >=13.0 mg/dL total bilirubin and/or >=3.5 mg/dL   direct bilirubin.         3.3  Comment: Falsely low lactic acid results can be found in samples   containing >=13.0 mg/dL total bilirubin and/or >=3.5 mg/dL   direct bilirubin.           Leukocyte Esterase, UA     Negative           Lipase       28         Lymph  #       1.0         Lymph %       5.4         MCH       28.8         MCHC       31.9         MCV       90         Mono #       1.0         Mono %       5.2         MPV       9.6         NITRITE UA     Negative           nRBC       0         Blood, UA     Negative           QRS Duration         92       [facility] QTC Calculation         474       pH, UA     6.0           Platelet Count       359         Potassium       4.5         PROTEIN TOTAL       7.2         Protein, UA     Trace  Comment: Recommend a 24 hour urine protein or a urine   protein/creatinine ratio if globulin induced proteinuria is  clinically suspected.             RBC       4.55         RDW       14.1         Sodium       141         Spec Grav UA     1.015           Specimen UA     Urine, Clean Catch           Troponin I       <0.006  Comment: The reference interval for Troponin I represents the 99th percentile   cutoff   for our facility and is consistent with 3rd generation assay   performance.           UROBILINOGEN UA     Negative           WBC       18.39                                Significant Imaging: I have reviewed all pertinent imaging results/findings within the past 24 hours.  CT Abdomen Pelvis  Without Contrast   Final Result      No definite acute abnormality identified.      Constipation versus impaction.      Complete findings as above.      All CT scans at this facility are performed  using dose modulation techniques as appropriate to performed exam including the following:  automated exposure control; adjustment of mA and/or kV according to the patients size (this includes techniques or standardized protocols for targeted exams where dose is matched to indication/reason for exam: i.e. extremities or head);  iterative reconstruction technique.         Electronically signed by: Brandon Hernandez   Date:    09/24/2024   Time:    18:31      CT Head Without Contrast   Final Result      1.  Negative for acute intracranial process. Negative for  "hemorrhage, or skull fracture.      2.  Marked cerebral atrophy noted.  Intracranial atherosclerotic disease noted.  Small vessel ischemic changes noted.      3.  Debris within both ear canals.      All CT scans at this facility are performed  using dose modulation techniques as appropriate to performed exam including the following:  automated exposure control; adjustment of mA and/or kV according to the patients size (this includes techniques or standardized protocols for targeted exams where dose is matched to indication/reason for exam: i.e. extremities or head);  iterative reconstruction technique.         Electronically signed by: Lex Fraga MD   Date:    09/24/2024   Time:    15:50      X-Ray Chest AP Portable   Final Result      1.  Negative for acute process involving the chest.      2.  Stable findings as noted above.         Electronically signed by: Lex Fraga MD   Date:    09/24/2024   Time:    15:37      X-Ray Pelvis Routine AP    (Results Pending)   X-Ray Calcaneus Bilateral    (Results Pending)   US Lower Extremity Veins Bilateral    (Results Pending)      Assessment/Plan:     * Acute metabolic encephalopathy  Acute metabolic encephalopathy that is likely multifactorial due to underlying sepsis/infection with multiple decubitus ulcers, constipation versus fecal impaction, polypharmacy, and underlying Alzheimer's dementia with possible progression  -CT scan of head with Negative for acute intracranial process. Negative for hemorrhage, or skull fracture. Marked cerebral atrophy noted.  Intracranial atherosclerotic disease noted.  Small vessel ischemic changes noted. Debris within both ear canals."  -CT scan of abdomen and pelvis with constipation versus impaction and otherwise negative  -white blood cell count 18.39, lactic acid level 3.3, procalcitonin level 0.04, blood cultures pending, urinalysis negative, troponin negative, creatinine 1.1, LFTs unremarkable, and currently afebrile  -NPO, IV " "fluids, broad-spectrum IV antibiotics  -check B12, folate, TSH, free T4, ammonia level, and UDS  -check influenza a/B and COVID-19      Sepsis  This patient does have evidence of infective focus  My overall impression is sepsis.  Source: Skin and Soft Tissue (location multiple decubitus ulcers to sacrum and bilateral heels)  Antibiotics given-   Antibiotics (72h ago, onward)      Start     Stop Route Frequency Ordered    09/25/24 0130  ceFEPIme (MAXIPIME) 2 g in D5W 100 mL IVPB (MB+)         -- IV Every 8 hours (non-standard times) 09/24/24 1950    09/24/24 2100  vancomycin 2 g in dextrose 5 % 500 mL IVPB         -- IV Once 09/24/24 1955 09/24/24 2037  vancomycin - pharmacy to dose  (vancomycin IVPB (PEDS and ADULTS))        Placed in "And" Linked Group    -- IV pharmacy to manage frequency 09/24/24 1950          Latest lactate reviewed-  Recent Labs   Lab 09/24/24  1852   LACTATE 1.8     Organ dysfunction not present    Fluid challenge Not needed - patient is not hypotensive      Post- resuscitation assessment No - Post resuscitation assessment not needed       Will Not start Pressors- Levophed for MAP of 65  Source control achieved by:  Patient received 3502 mL normal saline IV fluid bolus ordered by the emergency department physician.  Continue IV cefepime and IV vancomycin.    Decubitus ulcers  Sepsis with multiple decubitus ulcers to sacrum and bilateral heels (right greater than left)  -white blood cell count 18.39, lactic acid level 3.3, procalcitonin level 0.04, blood cultures pending, and currently afebrile  -check bilateral heel x-rays  -check ESR, CRP, A1c, BNP, and pre-albumin level  -check lactic acid level q.4 hours x2  -CT scan of abdomen and pelvis with no mention of bony involvement of sacrum  -empiric IV cefepime and IV vancomycin  -frequent turns q.2 hours, apply bilateral heel protectors  -check bilateral lower extremity venous ultrasound given bilateral edema right greater than left  -consult " Wound Care  -consult Infectious Disease      Constipation  -Currently NPO with IV fluids  -give brown bomb enema x1 dose now, manually disimpact if needed  -p.r.n. Dulcolax suppositories  -check thyroid studies  -check KUB in a.m.      Hyperglycemia  Check A1c.  Blood glucose checks q.6 hours with general hypoglycemia protocol.  No sliding scale insulin at this time.      Bladder cancer  -p.r.n. bladder scans  -urinalysis negative  -BUN 12, creatinine 1.1      Primary hypertension  Chronic, controlled. Latest blood pressure and vitals reviewed-     Temp:  [97.8 °F (36.6 °C)-98.4 °F (36.9 °C)]   Pulse:  []   Resp:  [18-22]   BP: (130-176)/(69-99)   SpO2:  [96 %-97 %] .   Home meds for hypertension were reviewed and noted below.   Hypertension Medications               amLODIPine (NORVASC) 10 MG tablet Take 10 mg by mouth once daily.    furosemide (LASIX) 20 MG tablet Take 20 mg by mouth daily as needed.            While in the hospital, will manage blood pressure as follows; Adjust home antihypertensive regimen as follows- hold home medications while NPO.  P.r.n. IV hydralazine with parameters.    Will utilize p.r.n. blood pressure medication only if patient's blood pressure greater than 160/100 and he develops symptoms such as worsening chest pain or shortness of breath.    DANNY (generalized anxiety disorder)  See discussion for PTSD      PTSD (post-traumatic stress disorder)  PTSD and generalized anxiety disorder  -home medications held in the setting of acute metabolic encephalopathy with lethargy      Stage 3a chronic kidney disease  Creatine stable for now. BMP reviewed- noted Estimated Creatinine Clearance: 56.2 mL/min (based on SCr of 1.1 mg/dL). according to latest data. Based on current GFR, CKD stage is stage 3 - GFR 30-59.  Monitor UOP and serial BMP and adjust therapy as needed. Renally dose meds. Avoid nephrotoxic medications and procedures.    Late onset Alzheimer's dementia with mood  disturbance  Alzheimer's dementia with increased confusion from baseline  -currently NPO with IV fluids  -aspiration and fall precautions  -PT, OT, and ST evaluations  -hold Aricept, Namenda, Remeron, Depakote, and Risperdal until safe for p.o. intake; consider decreased dosing as polypharmacy may be contributing some to patient's lethargy and confusion      VTE Risk Mitigation (From admission, onward)           Ordered     enoxaparin injection 40 mg  Daily         09/24/24 1950     IP VTE HIGH RISK PATIENT  Once         09/24/24 1950     Place sequential compression device  Until discontinued         09/24/24 1950                                    Lisa Andrade MD  Department of Hospital Medicine  [facility] - Emergency Dept.

## 2024-09-25 NOTE — CONSULTS
[facility] Surg  Wound Care    Patient Name:  Gabo Altamirano   MRN:  [id number]  Date: 9/25/2024  Diagnosis: Acute metabolic encephalopathy    History:     Past Medical History:   Diagnosis Date    Alzheimer's dementia     Anxiety disorder, unspecified     Bladder cancer     CKD (chronic kidney disease) stage 3, GFR 30-59 ml/min     Gout, unspecified     Hypertension     PTSD (post-traumatic stress disorder)        Social History     Socioeconomic History    Marital status:    Tobacco Use    Smoking status: Never    Smokeless tobacco: Former   Substance and Sexual Activity    Alcohol use: Not Currently    Drug use: Not Currently    Sexual activity: Not Currently     Social Determinants of Health     Financial Resource Strain: Low Risk  (12/8/2021)    [facility] from [facility] Subsidi[facility] and Affiliates, [facility]    Overall Financial Resource Strain (CARDIA)     Difficulty of Paying Living Expenses: Not hard at all   Food Insecurity: Unknown (12/8/2021)    Received from [facility] Subsidiaries and Affiliates, [facility] and [facility]ates    Hunger Vital Sign     Worried About Running Out of Food in the Last Year: Never true   Transportation Needs: Unknown (12/8/2021)    Received from [facility] Subsidiaries and Affiliates, [facility] and Affiliates    PRAPARE - Transportation     Lack of Transportation (Medical): No   Physical Activity: Unknown (12/8/2021)    Received from [facility] Subsidiaries and Affiliates, [facility] Subsidiaries and Affiliates    Exercise Vital Sign     Days of  "Exercise per Week: 0 days   Stress: No Stress Concern Present (12/8/2021)    [facility]    [facility] of Occupational Health - Occupational Stress Questionnaire     Feeling of Stress : Not at all   Housing Stability: Unknown (12/8/2021)    [facility]    Housing Stability Vital Sign     Unable to Pay for Housing in the Last Year: No       Precautions:     Allergies as of 09/24/2024    (No Known Allergies)       WO Assessment Details/Treatment         Consulted to evaluate wounds to B heels, sacrum and R ear.  Mr. Altamirano is a 80 yo male patient admitted 9/24/24 with confusion for 2 days PTA and loss of consciousness (was found unresponsive in his bed @ nursing home).  Diagnosed w/ acute metabolic encephalopathy.  PMH includes:  near-syncope, dehydration, Alzheimer's dementia, UTI, gout, hypertension, chronic kidney disease stage IIIA, PTSD, generalized anxiety disorder, and bladder cancer.  Noted patient is afebrile this morning but WBC count elevated @ > 18.     This morning, patient resting quietly in bed; he remains relatively lethargic but is arousable and does answer questions.  Son is @ bedside this morning as well.  Noted what son describes as small "scratch" to R earlobe; very shallow wound w/ red wound base.  Noted only minimal serosanguineous exudate to current dressing.  Plan:  daily application of Band-aid dressing to avoid further trauma from patient scratching @ site.  Also noted that B arms are wrapped with bandange; son states he noticed patient scratching @ his arms (as he sometimes does) so nursing staff applied wraps to protect skin.  Did NOT " "assess these sites today.    Assisted to turn patient onto his L side.  Removed sacral dressing.  Noted unstageable pressure injury to sacrum measured cluster of site:  6.2x6.5x0.4 cm in total.  Wound bed is a mixture of black firm eschar, yellow fibrinous tissue and minimal amount of nongranular red tissue.  Periwound is intact and appears that nursing home staff has been using Gentian violet as it remains quite purple/blue in color.  Noted no induration or fluctuance; no odor noted.  There is a moderate amount of exudate:  serosanguineous.    Noted unstageable pressure injury to R heel measuring 2.8x3x0.3 cm.  Wound bed is mostly yellow slough  but with small amount of subcutaneous tissue noted.  Periwound intact and a little maceration; again, noted evidence of Gentian violet.  No induration, no fluctuance, no odor detected.  Only minimal amount of serosanguineous exudate this visit.  No evidence that either the sacrum or the R heel is infected @ this time.  However, will start daily dressing changes to these 2 sites using Vashe moistened gauze and cover dressing in order to address bacterial load.    Noted ulcer to L heel now completely resolved.  Noted only few small areas of dark red/maroon colored sites w/ intact skin that blanches very well.  Recommend:  continue use of foam border "prevention" dressing to be changed weekly along w/ offloading heel boots to BLE's @ all times.  Will implement pressure injury prevention measures.  Will order alternating pressure pump to be added to current pressure reduction mattress.    Plan f/u in 1 week to re-evaluate.             09/25/24 0820   WOCN Assessment   WOCN Total Time (mins) 45   Visit Date 09/25/24   Visit Time 0820   Consult Type New   WOCN Speciality Wound   Wound pressure;other  (trauma)   Number of Wounds 3   Intervention assessed;applied;chart review;coordination of care   Teaching on-going   Skin Interventions   Device Skin Pressure Protection absorbent " pad utilized/changed   Pressure Reduction Devices heel offloading device utilized;pressure-redistributing mattress utilized   Pressure Reduction Techniques weight shift assistance provided;positioned off wounds;heels elevated off bed   Skin Protection incontinence pads utilized   Positioning   Body Position turned;head facing, left   Head of Bed (HOB) Positioning HOB at 20-30 degrees   Positioning/Transfer Devices wedge;in use   Pressure Injury Prevention    Check Moisture Management Pad Done   Heel protection technique Foam dressing   Heel preventative measures Replace   Check Medical Devices Done        Wound 09/24/24 Traumatic Right Ear   Date First Assessed: 09/24/24   Present on Original Admission: Yes  Primary Wound Type: Traumatic  Side: Right  Location: (c) Ear   Wound Image    Dressing Appearance Intact;Moist drainage   Drainage Amount Scant   Drainage Characteristics/Odor Serosanguineous   Appearance Red   Tissue loss description Partial thickness   Red (%), Wound Tissue Color 100 %   Periwound Area Intact   Wound Edges Open   Wound Length (cm) 0.4 cm   Wound Width (cm) 0.6 cm   Wound Depth (cm) 0.1 cm   Wound Volume (cm^3) 0.024 cm^3   Wound Surface Area (cm^2) 0.24 cm^2   Care Cleansed with:;Antimicrobial agent   Dressing Applied;Bandaid   Periwound Care Skin barrier film applied   Dressing Change Due 09/26/24   [REMOVED]      Wound 07/15/24 2015 Pressure Injury Left Heel   Final Assessment Date/Final Assessment Time: 09/25/24 0820  Date First Assessed/Time First Assessed: 07/15/24 2015   Present on Original Admission: Yes  Primary Wound Type: Pressure Injury  Side: Left  Location: Heel  Wound Outcome: Healed   Wound Image    Care Cleansed with:;Antimicrobial agent   Dressing Foam;Island/border  (for prevention)   Periwound Care Skin barrier film applied   Dressing Change Due 10/02/24        Wound 07/16/24 0900 Pressure Injury Sacral spine   Date First Assessed/Time First Assessed: 07/16/24 0900    Present on Original Admission: Yes  Primary Wound Type: Pressure Injury  Location: Sacral spine   Wound Image    Pressure Injury Stage U   Dressing Appearance Intact;Moist drainage   Drainage Amount Moderate   Drainage Characteristics/Odor Serosanguineous   Appearance Black;Eschar;Yellow;Fibrin;Red;Not granulating   Tissue loss description Full thickness   Black (%), Wound Tissue Color 35 %   Red (%), Wound Tissue Color 20 %   Yellow (%), Wound Tissue Color 45 %   Periwound Area Intact   Wound Edges Irregular   Wound Length (cm) 6.2 cm   Wound Width (cm) 6.5 cm   Wound Depth (cm) 0.4 cm   Wound Volume (cm^3) 16.12 cm^3   Wound Surface Area (cm^2) 40.3 cm^2   Care Cleansed with:;Antimicrobial agent   Dressing Applied;Gauze, wet to moist;Foam;Island/border  (Vashe moist gauze)   Periwound Care Skin barrier film applied   Dressing Change Due 09/26/24        Wound 07/15/24 2015 Pressure Injury Right Heel   Date First Assessed/Time First Assessed: 07/15/24 2015   Present on Original Admission: Yes  Primary Wound Type: Pressure Injury  Side: Right  Location: Heel   Wound Image    Pressure Injury Stage U   Dressing Appearance Intact;Moist drainage   Drainage Amount Small   Drainage Characteristics/Odor Serosanguineous;No odor   Appearance Yellow;Red;Necrotic;Slough;Adipose   Tissue loss description Full thickness   Red (%), Wound Tissue Color 25 %   Yellow (%), Wound Tissue Color 75 %   Periwound Area Intact;Macerated   Wound Edges Open   Wound Length (cm) 2.8 cm   Wound Width (cm) 3 cm   Wound Depth (cm) 0.3 cm   Wound Volume (cm^3) 2.52 cm^3   Wound Surface Area (cm^2) 8.4 cm^2   Care Cleansed with:;Antimicrobial agent   Dressing Applied;Gauze, wet to moist;Absorptive Pad;Rolled gauze  (Vashe moist gauze)   Periwound Care Moisture barrier applied   Dressing Change Due 09/26/24 09/25/2024

## 2024-09-25 NOTE — ASSESSMENT & PLAN NOTE
Alzheimer's dementia with increased confusion from baseline  -currently NPO with IV fluids  -aspiration and fall precautions  -PT, OT, and ST evaluations  -hold Aricept, Namenda, Remeron, Depakote, and Risperdal until safe for p.o. intake; consider decreased dosing as polypharmacy may be contributing some to patient's lethargy and confusion

## 2024-09-25 NOTE — ASSESSMENT & PLAN NOTE
Chronic, controlled. Latest blood pressure and vitals reviewed-     Temp:  [97.8 °F (36.6 °C)-98.4 °F (36.9 °C)]   Pulse:  []   Resp:  [18-22]   BP: (130-176)/(69-99)   SpO2:  [96 %-97 %] .   Home meds for hypertension were reviewed and noted below.   Hypertension Medications               amLODIPine (NORVASC) 10 MG tablet Take 10 mg by mouth once daily.    furosemide (LASIX) 20 MG tablet Take 20 mg by mouth daily as needed.            While in the hospital, will manage blood pressure as follows; Adjust home antihypertensive regimen as follows- hold home medications while NPO.  P.r.n. IV hydralazine with parameters.    Will utilize p.r.n. blood pressure medication only if patient's blood pressure greater than 160/100 and he develops symptoms such as worsening chest pain or shortness of breath.

## 2024-09-25 NOTE — PT/OT/SLP EVAL
Speech Language Pathology Evaluation  Cognitive/Bedside Swallow    Patient Name:  Gabo Altamirano   MRN:  [id number]  Admitting Diagnosis: Acute metabolic encephalopathy    Recommendations:                  General Recommendations:   Diet f/u and Ongoing GOC/ACP discussion  Diet recommendations:  Minced & Moist Diet - IDDSI Level 5, Thin liquids - IDDSI Level 0   Aspiration Precautions: Assistance with meals, Avoid talking while eating, Eliminate distractions, Feed only when awake/alert, Frequent oral care, HOB to 90 degrees, Meds crushed in puree, No straws, and Standard aspiration precautions   General Precautions: Standard, aspiration  Communication strategies:  provide increased time to answer and Baseline Alzheimer's dementia    Assessment:     Gabo Altamirano is a 79 y.o. male with a PMHx significant for dehydration, Alzheimer's dementia, UTI, gout, hypertension, chronic kidney disease stage IIIA, PTSD, generalized anxiety disorder, and bladder cancer who was sent to ED s/t confusion onset over the last 1-2 days with difficulty arousing the patient at NH (jeanette gutiérrez). Pt seen today at bedside w two sons present to provide hx as pt not oriented x4 able to respond to simple y/n questions. Sons endorse that pt is slightly more altered from baseline and report decreased PO intake over last month or so at NH. They deny any dysphagia hx. Pt's CSE was significant for one instance of overt s/s of aspiration during impulsive large straw sip resulting in immediate wet cough, during this trial pt was visably distracted by television likely negatively impacting swallow safety. After this, w the elimination of distractions, no additional instances of overt s/s of aspiration were appreciated. Pt is recommended for a diet of minced and moist solids and thin liquids (IDDSI 5/0) w meds crushed in puree and mealtime assistance w decreased distractions. Cognitive deficits increase risk for dysphagia. Given pt PMHx,  alzheimer's dementia, multiple wounds, and advanced age he is recommended for ongoing GOC/ACP discussion w family. ST to follow-up and assess diet tolerance.     History:     Past Medical History:   Diagnosis Date    Alzheimer's dementia     Anxiety disorder, unspecified     Bladder cancer     CKD (chronic kidney disease) stage 3, GFR 30-59 ml/min     Gout, unspecified     Hypertension     PTSD (post-traumatic stress disorder)        Past Surgical History:   Procedure Laterality Date    COLONOSCOPY      ESOPHAGOGASTRODUODENOSCOPY      HAND SURGERY Right     JOINT REPLACEMENT         Social History: Patient lives at NH, [facility].    Prior Intubation HX:  NA this admission    Modified Barium Swallow: NA per family/chart review     CT HEAD WITHOUT CONTRAST on 09/24/2024:     FINDINGS:  The ventricles are midline and the CSF spaces are markedly prominent.  The gray-white matter junction is well preserved. Negative for intracranial vascular abnormalities. Negative for mass, mass effect, cerebral edema, hemorrhage or abnormal fluid collections.  Intracranial atherosclerotic disease noted.  Small vessel ischemic changes noted.     The skull and scalp are  intact.  Rightward nasal septal deviation.  Debris within the left greater than right ear canals.  The rest of the paranasal sinuses, mastoid air cells, middle ears and ear canals are clear. The globes are intact.     Impression:     1.  Negative for acute intracranial process. Negative for hemorrhage, or skull fracture.     2.  Marked cerebral atrophy noted.  Intracranial atherosclerotic disease noted.  Small vessel ischemic changes noted.     3.  Debris within both ear canals.     All CT scans at this facility are performed  using dose modulation techniques as appropriate to performed exam including the following:  automated exposure control; adjustment of mA and/or kV according to the patients size (this includes techniques or standardized protocols for targeted  "exams where dose is matched to indication/reason for exam: i.e. extremities or head);  iterative reconstruction technique.    Electronically signed by:Lex Fraga MD  Date:                                            09/24/2024  Time:                                           15:50    CT ABDOMEN PELVIS WITHOUT CONTRAST on 09/24/2024:     FINDINGS:  Heart: Normal in size. No pericardial effusion.     Lung Bases: Well aerated, without consolidation or pleural fluid.     Liver: Normal in size and attenuation, with no focal hepatic lesions.     Gallbladder: No calcified gallstones.     Bile Ducts: No evidence of dilated ducts.     Pancreas: No mass or peripancreatic fat stranding.     Spleen: Unremarkable.     Adrenals: Unremarkable.     Kidneys/ Ureters: No hydronephrosis.  Nonobstructive nephrolithiasis bilaterally.     Bladder: No evidence of wall thickening.     Reproductive organs: Unremarkable.     GI Tract/Mesentery: No evidence of bowel obstruction or inflammation.  Increased rectal stool burden concerning for constipation or impaction.  Correlation is advised.     Peritoneal Space: No ascites. No free air.     Retroperitoneum: No significant adenopathy.     Abdominal wall: Mild soft tissue swelling along the right hip of unclear significance.  Correlation is advised.     Vasculature: No significant atherosclerosis or aneurysm.     Bones: No acute fracture.     Impression:  No definite acute abnormality identified.     Constipation versus impaction.     Complete findings as above.       Electronically signed by:Brandon Hernandez  Date:                                            09/24/2024  Time:                                           18:31       Prior diet: Per NH reports, mechanical soft w "pureed meats, breads, and biscuits".    Subjective     Pt seen resting in bed w two sons present for ST eval. Easily able to be aroused.  Patient goals: unable to state     Pain/Comfort:  Pain Rating 1: 0/10  Pain Rating " Post-Intervention 1: 0/10  Pain Rating 2: 0/10  Pain Rating Post-Intervention 2: 0/10    Respiratory Status: Nasal cannula    Objective:     Cognitive Status:    Pt w baseline of Alzheimer's dementia, per sons increased AMS from baseline. Disoriented x4 w deficits in attention throughout requiring verbal cues       Receptive Language:   Comprehension:   Pt able to respond to y/n questions simple.     Pragmatics:    Flat affect    Expressive Language:  Verbal:    Pt w decreased content words and short utterances. Baseline      Motor Speech:  WFL    Voice:   WFL    Oral Musculature Evaluation  Oral Musculature: general weakness (limited assessment s/t pt AMS)  Dentition: scattered dentition, teeth in poor condition  Secretion Management: adequate  Mucosal Quality: sticky  Mandibular Strength and Mobility: WFL  Oral Labial Strength and Mobility: WFL  Lingual Strength and Mobility: WFL  Velar Elevation: WFL  Buccal Strength and Mobility: WFL  Volitional Cough: appreciated  Volitional Swallow: appreciated  Voice Prior to PO Intake: clear    Bedside Swallow Eval:     Clinical Swallow Examination:   Of note, patient was fed by SLP throughout evaluation. Patient presented with:     CONSISTENCY  NOTES   THIN (IDDSI 0) Cup sips of water    Straw sips   -No overt s/s of aspiration appreciated, required hand over hand assistance     - one instance of overt s/s of aspiration during impulsive large sequential straw sip resulting in immediate wet cough, pt was visably distracted by television likely negatively impacting swallow safety.   PUREE (IDDSI 4/Extremely Thick)   TSP/TBSP bites of pudding No overt s/s of aspiration appreciated   SOLID (IDDSI 7/Regular) Bite of pablo cracker cookie   No overt s/s of aspiration appreciated. Munching mastication pattern, prolonged.        Thickened liquids were not used in this assessment. Giuseppe (2018) reported that thickened liquids have no sound evidence at reducing the risk of pneumonia  in patients with dysphagia and can cause harm by increasing their risk of dehydration. It also presents an increased risk of UTI, electrolyte imbalance, constipation, fecal impaction, cognitive impairment, functional decline and even death (Langmore, 2002; Arianna, 2016).  Thickened liquids are associated with risks including dehydration, increased pharyngeal residue, potential interference with medication absorption, and decreased quality of life (Aubrie, 2013). Thickened liquids are also more likely to be silently aspirated than thin liquids (Alfa et al., 2018). This supports the assertion that we should confirm a patient requires thickened liquids with an instrumental swallow study prior to recommending them.    References:   Aubrie AUSTIN (2013). Thickening agents used for dysphagia management: Effect on bioavailability of water, medication and feelings of satiety. Nutrition Journal, 12, 54. https://doi.org/10.1186/7269-0965-09-54    GEOVANNA Grimm, KENYON Badillo, ALIX Bhatia, & GEOVANNA Adame (2018). Cough response to aspiration in thin and thick fluids during FEES in hospitalized inpatients. International journal of language & communication disorders, 53(5), 909-918. https://doi.org/10.1111/6892-2451.34109    INTERPRETATION AND RISK ASSESSMENT:  Clinical swallow evaluation (CSE) revealed oral phase characterized by lingual, labial, and buccal strength and range of motion adequate for lip closure, bolus preparation and propulsion. The patient had no anterior loss of the bolus with complete closure of the lips around the utensils. No residue remained in the oral cavity following the swallow. Patient with one instance of overt clinical sign/symptoms of aspiration during impulsive large sequential straw sip resulting in immediate wet cough, pt was visably distracted by television likely negatively impacting swallow safety.. Patient presents with a possibly inefficient swallow as indicated by prolonged mastication w  munching pattern. Contributing risk factors for dysphagia include deficits in alertness and cognitive deficits.    Clinical signs of oropharyngeal dysphagia, likely Chronic related to presbyphagia and Alzheimer's dementia. Swallowing prognosis is Fair. Instrumental swallow study is not indicated to assess the swallowing physiology and rule out aspiration of various consistencies at this time. ST to follow-up and assess diet tolerance.      Goals:   Multidisciplinary Problems       SLP Goals          Problem: SLP    Goal Priority Disciplines Outcome   SLP Goal     SLP    Description: TPW safely consume least restrictive PO diet                       Plan:     Patient to be seen:  2 x/week   Plan of Care expires:  10/02/24  Plan of Care reviewed with:  patient, son   SLP Follow-Up:  Yes       Discharge recommendations:  Therapy Intensity Recommendations at Discharge:  (pending pt acute progress/ACP/GOC discussion)   Barriers to Discharge:  None    Time Tracking:     SLP Treatment Date:   09/25/24  Speech Start Time:  0955  Speech Stop Time:  1020     Speech Total Time (min):  25 min    Billable Minutes: Eval 10  and Eval Swallow and Oral Function 15    Ana Adams MA, PL-SLP, CF-SLP  Speech Language Pathologist  09/25/2024

## 2024-09-25 NOTE — ASSESSMENT & PLAN NOTE
-IV fluids  -give brown bomb enema x1 dose now, manually disimpact if needed  -p.r.n. Dulcolax suppositories  -check thyroid studies  -check KUB in a.m. - unchanged  -we will give a dose of Mag citrate and fleets enema

## 2024-09-25 NOTE — CONSULTS
Pharmacokinetic Assessment Follow Up: IV Vancomycin    Vancomycin Regimen Plan:  Change regimen to Vancomycin 1250 mg IV every 12 hours with next serum trough concentration measured at 0900 prior to 4th dose on 9/26 due to improvement in renal function (based on SCr of 0.7 mg/dL today, decreased from 1.1 mg/dL on 9/24.).      Patient brief summary:  Gabo Altamirano is a 79 y.o. male initiated on antimicrobial therapy with IV Vancomycin for treatment of skin & soft tissue infection and sepsis.     Drug Allergies:   Review of patient's allergies indicates:  No Known Allergies    Actual Body Weight: 83.4 kg    Renal Function:   Estimated Creatinine Clearance: 88.4 mL/min (based on SCr of 0.7 mg/dL). Decreased from 1.1 mg/dL on 9/24.     Dialysis Method (if applicable): N/A    CBC (last 72 hours):  Recent Labs   Lab Result Units 09/24/24 1620 09/24/24 2045 09/25/24  0757   WBC K/uL 18.39*  --  11.00   Hemoglobin g/dL 13.1*  --  11.7*   Hemoglobin A1C %  --  4.9  --    Hematocrit % 41.1  --  37.7*   Platelets K/uL 359  --  316   Gran % % 87.8*  --  73.2*   Lymph % % 5.4*  --  14.6*   Mono % % 5.2  --  8.8   Eosinophil % % 0.3  --  2.0   Basophil % % 0.4  --  0.9   Differential Method  Automated  --  Automated       Metabolic Panel (last 72 hours):  Recent Labs   Lab Result Units 09/24/24 1620 09/24/24  1640 09/24/24 2045 09/25/24  0757   Sodium mmol/L 141  --   --  143   Potassium mmol/L 4.5  --   --  3.5   Chloride mmol/L 107  --   --  113*   CO2 mmol/L 23  --   --  22*   Glucose mg/dL 128*  --   --  91   Glucose, UA   --  Negative  --   --    BUN mg/dL 12  --   --  8   Creatinine mg/dL 1.1  --   --  0.7  0.7   Creatinine, Urine mg/dL  --  115.8  --   --    Albumin g/dL 3.1*  --   --   --    Total Bilirubin mg/dL 0.4  --   --   --    Alkaline Phosphatase U/L 92  --   --   --    AST U/L 23  --   --   --    ALT U/L 13  --   --   --    Magnesium mg/dL  --   --  1.9  --        Vancomycin  Administrations:  vancomycin given in the last 96 hours                     vancomycin 2 g in dextrose 5 % 500 mL IVPB (mg) 2,000 mg New Bag 09/24/24 2052                    Microbiologic Results:  Microbiology Results (last 7 days)       Procedure Component Value Units Date/Time    Blood culture #1 **CANNOT BE ORDERED STAT** [[id number]] Collected: 09/24/24 1622    Order Status: Completed Specimen: Blood from Peripheral, Antecubital, Right Updated: 09/25/24 0545     Blood Culture, Routine No Growth to date    Blood culture #2 **CANNOT BE ORDERED STAT** [[id number]] Collected: 09/24/24 1621    Order Status: Completed Specimen: Blood from Peripheral, Antecubital, Left Updated: 09/25/24 0545     Blood Culture, Routine No Growth to date    Influenza A & B by Molecular [[id number]] Collected: 09/24/24 2054    Order Status: Completed Specimen: Nasopharyngeal Swab Updated: 09/24/24 2154     Influenza A, Molecular Negative     Influenza B, Molecular Negative     Flu A & B Source Nasal swab          Thank you for allowing us to participate in this patient's care.     Justen Turner PharmD 09/25/2024 9:18 AM

## 2024-09-25 NOTE — ASSESSMENT & PLAN NOTE
Creatine stable for now. BMP reviewed- noted Estimated Creatinine Clearance: 88.4 mL/min (based on SCr of 0.7 mg/dL). according to latest data. Based on current GFR, CKD stage is stage 3 - GFR 30-59.  Monitor UOP and serial BMP and adjust therapy as needed. Renally dose meds. Avoid nephrotoxic medications and procedures.

## 2024-09-25 NOTE — PROGRESS NOTES
Pharmacokinetic Initial Assessment: IV Vancomycin    Assessment/Plan:    Initiate intravenous vancomycin with loading dose of 2000 mg once followed by a maintenance dose of vancomycin 1750 mg IV every 24 hours  Desired empiric serum trough concentration is 15 to 20 mcg/mL  Draw vancomycin trough level 60 min prior to third dose on 9/26/24 at approximately 2000.  Pharmacy will continue to follow and monitor vancomycin.      Please contact pharmacy at extension [phone] with any questions regarding this assessment.     Thank you for the consult,   Sanjay Burden       Patient brief summary:  Gabo Altamirano is a 79 y.o. male initiated on antimicrobial therapy with IV Vancomycin for treatment of suspected skin & soft tissue infection with sepsis.    Drug Allergies:   Review of patient's allergies indicates:  No Known Allergies    Actual Body Weight:   83.4 kg    Renal Function:   Estimated Creatinine Clearance: 56.2 mL/min (based on SCr of 1.1 mg/dL).,     Dialysis Method (if applicable):  N/A    CBC (last 72 hours):  Recent Labs   Lab Result Units 09/24/24  1620   WBC K/uL 18.39*   Hemoglobin g/dL 13.1*   Hematocrit % 41.1   Platelets K/uL 359   Gran % % 87.8*   Lymph % % 5.4*   Mono % % 5.2   Eosinophil % % 0.3   Basophil % % 0.4   Differential Method  Automated       Metabolic Panel (last 72 hours):  Recent Labs   Lab Result Units 09/24/24  1620 09/24/24  1640 09/24/24  2045   Sodium mmol/L 141  --   --    Potassium mmol/L 4.5  --   --    Chloride mmol/L 107  --   --    CO2 mmol/L 23  --   --    Glucose mg/dL 128*  --   --    Glucose, UA   --  Negative  --    BUN mg/dL 12  --   --    Creatinine mg/dL 1.1  --   --    Albumin g/dL 3.1*  --   --    Total Bilirubin mg/dL 0.4  --   --    Alkaline Phosphatase U/L 92  --   --    AST U/L 23  --   --    ALT U/L 13  --   --    Magnesium mg/dL  --   --  1.9         Microbiologic Results:  Microbiology Results (last 7 days)       Procedure Component Value Units Date/Time     Blood culture #1 **CANNOT BE ORDERED STAT** [[id number]] Collected: 09/24/24 1622    Order Status: Sent Specimen: Blood from Peripheral, Antecubital, Right Updated: 09/24/24 2224    Blood culture #2 **CANNOT BE ORDERED STAT** [[id number]] Collected: 09/24/24 1621    Order Status: Sent Specimen: Blood from Peripheral, Antecubital, Left Updated: 09/24/24 2224    Influenza A & B by Molecular [[id number]] Collected: 09/24/24 2054    Order Status: Completed Specimen: Nasopharyngeal Swab Updated: 09/24/24 2154     Influenza A, Molecular Negative     Influenza B, Molecular Negative     Flu A & B Source Nasal swab

## 2024-09-25 NOTE — ASSESSMENT & PLAN NOTE
Check A1c.  Blood glucose checks q.6 hours with general hypoglycemia protocol.  No sliding scale insulin at this time.

## 2024-09-25 NOTE — ASSESSMENT & PLAN NOTE
PTSD and generalized anxiety disorder  -home medications held in the setting of acute metabolic encephalopathy with lethargy

## 2024-09-25 NOTE — PT/OT/SLP EVAL
Physical Therapy Evaluation and Discharge Note    Patient Name:  Gabo Altamirano   MRN:  [id number]    Recommendations:     Discharge Recommendations: No Therapy Indicated  Discharge Equipment Recommendations: none   Barriers to discharge: None    Assessment:     Gabo Altamirano is a 79 y.o. male admitted with a medical diagnosis of Acute metabolic encephalopathy. .  At this time, patient is functioning at their prior level of function and does not require further acute PT services.     Recent Surgery: * No surgery found *      Plan:     During this hospitalization, patient does not require further acute PT services.  Please re-consult if situation changes.      Subjective     Chief Complaint: NONE   Patient/Family Comments/goals: NONE STATED   Pain/Comfort:  Pain Rating 1: 0/10  Pain Rating Post-Intervention 1: 0/10    Patients cultural, spiritual, Buddhism conflicts given the current situation:      Living Environment:   PT LIVES AT NH AND REQUIRES TOTAL CARE AS PT IS BED BOUND   Prior to admission, patients level of function was BED BOUND .  Equipment used at home: hospital bed.  DME owned (not currently used): none.  Upon discharge, patient will have assistance from NH STAFF.    Objective:     Communicated with NURSE AND EPIC CHART REVIEW  prior to session.  Patient found left sidelying with peripheral IV, telemetry upon PT entry to room.    General Precautions: Standard, fall, aspiration    Orthopedic Precautions:N/A   Braces: N/A  Respiratory Status: Room air    Exams:  RLE ROM: SEVERE LIMITED WITH FLEX CONTRACTURES  RLE Strength: UNABLE   LLE ROM: SEVERE LIMITED WITH FLEX CONTRACTURES  LLE Strength: UNABLE    Functional Mobility:  Bed Mobility:     Rolling Left:  dependence  Rolling Right: dependence  Scooting: dependence    AM-PAC 6 CLICK MOBILITY  Total Score:6       Treatment and Education:  P.T. COMPLETED PROM AP, HS , HIP ADD/ABD WITH VERY LITTLE ROM D/T CONTRACTURES. PT B LE ROM LIMITED ON R >  L. PT WAS ABLE TO FOLLOW MAX CUES FOR L UE ROM. PT SCOOTED TO HOB DEP AND ROLLED TO LEFT FOR L SIDELYING. PT LEFT WITH PRESSURE RELIEF BOOTS ON AND ALL NEEDS MET. PT SONS WERE EDUCATED ON ROLE OF P.T. AND REC FOR RESTORATIVE PROGRAM TO DEC INC BED SORES.     AM-PAC 6 CLICK MOBILITY  Total Score:6     Patient left left sidelying with all lines intact, call button in reach, and bed alarm on.    GOALS:   Multidisciplinary Problems       Physical Therapy Goals          Problem: Physical Therapy    Goal Priority Disciplines Outcome Goal Variances Interventions   Physical Therapy Goal     PT, PT/OT                          History:     Past Medical History:   Diagnosis Date    Alzheimer's dementia     Anxiety disorder, unspecified     Bladder cancer     CKD (chronic kidney disease) stage 3, GFR 30-59 ml/min     Gout, unspecified     Hypertension     PTSD (post-traumatic stress disorder)        Past Surgical History:   Procedure Laterality Date    COLONOSCOPY      ESOPHAGOGASTRODUODENOSCOPY      HAND SURGERY Right     JOINT REPLACEMENT         Time Tracking:     PT Received On: 09/25/24  PT Start Time: 0940     PT Stop Time: 1005  PT Total Time (min): 25 min     Billable Minutes: Evaluation 15 and Therapeutic Activity 10      09/25/2024

## 2024-09-25 NOTE — ASSESSMENT & PLAN NOTE
Sepsis with multiple decubitus ulcers to sacrum and bilateral heels (right greater than left)  -white blood cell count 18.39, lactic acid level 3.3, procalcitonin level 0.04, blood cultures pending, and currently afebrile  -check bilateral heel x-rays  -check ESR, CRP, A1c, BNP, and pre-albumin level  -check lactic acid level q.4 hours x2  -CT scan of abdomen and pelvis with no mention of bony involvement of sacrum  -empiric IV cefepime and IV vancomycin  -frequent turns q.2 hours, apply bilateral heel protectors  -check bilateral lower extremity venous ultrasound given bilateral edema right greater than left  -consult Wound Care  -consult Infectious Disease

## 2024-09-25 NOTE — PLAN OF CARE
Discussed poc with pt, pt verbalized understanding    Purposeful rounding every 2hours    VS wnl  Cardiac monitoring in use, pt is NSR, tele monitor # [id number]  Blood glucose monitoring   Fall precautions in place, remains injury free  Pt denies c/o pain and nausea    IVFs-LR @75mL  Accurate I&Os  Abx given as prescribed  Bed locked at lowest position  Call light within reach    Chart check complete  Will cont with POC

## 2024-09-25 NOTE — ASSESSMENT & PLAN NOTE
"Acute metabolic encephalopathy that is likely multifactorial due to underlying sepsis/infection with multiple decubitus ulcers, constipation versus fecal impaction, polypharmacy, and underlying Alzheimer's dementia with possible progression  -CT scan of head with Negative for acute intracranial process. Negative for hemorrhage, or skull fracture. Marked cerebral atrophy noted.  Intracranial atherosclerotic disease noted.  Small vessel ischemic changes noted. Debris within both ear canals."  -CT scan of abdomen and pelvis with constipation versus impaction and otherwise negative  -white blood cell count 18.39, lactic acid level 3.3, procalcitonin level 0.04, blood cultures pending, urinalysis negative, troponin negative, creatinine 1.1, LFTs unremarkable, and currently afebrile  -NPO, IV fluids, broad-spectrum IV antibiotics  -check B12, folate, TSH, free T4, ammonia level, and UDS  -check influenza a/B and COVID-19    "

## 2024-09-25 NOTE — ASSESSMENT & PLAN NOTE
Creatine stable for now. BMP reviewed- noted Estimated Creatinine Clearance: 56.2 mL/min (based on SCr of 1.1 mg/dL). according to latest data. Based on current GFR, CKD stage is stage 3 - GFR 30-59.  Monitor UOP and serial BMP and adjust therapy as needed. Renally dose meds. Avoid nephrotoxic medications and procedures.

## 2024-09-25 NOTE — HOSPITAL COURSE
Patient admitted with worsening mental status (underlying dementia) with leukocytosis, elevated lactic acid, and negative workup other than a sacral decubitus and bilateral heel decubiti.  Leukocytosis and lactic acid did resolve on hospital day 1.  Mental status did slightly improve per son at bedside.  We discussed hospice care at his nursing home, [facility], [place], which son is agreeable to at this time.  After discussion of hospice options patient's son agreed to a informational visit from [facility].  Plan for return to nursing home with hospice.  Of note, patient has advanced dementia with contracture of the limbs, decreased oral intake, and minimally verbal.  Patient was seen and examined on day of discharge.  POC dw son at bedside and patient was discharge back to [facility], [place] with [facility] hospice.

## 2024-09-25 NOTE — PT/OT/SLP EVAL
Occupational Therapy   Evaluation and Discharge Note    Name: Gaob Altamirano  MRN: [id number]  Admitting Diagnosis: Acute metabolic encephalopathy  Recent Surgery: * No surgery found *      Recommendations:     Discharge Recommendations: No Therapy Indicated (basic nursing facility)  Discharge Equipment Recommendations: to be determined by next level of care  Barriers to discharge:       Assessment:     Gabo Altamirano is a 79 y.o. male with a medical diagnosis of Acute metabolic encephalopathy. At this time, patient is functioning at their prior level of function and does not require further acute OT services.     Plan:     During this hospitalization, patient does not require further acute OT services.  Please re-consult if situation changes.    Plan of Care Reviewed with: patient    Subjective     Chief Complaint:  total care  Patient/Family Comments/goals    Occupational Profile:  Living Environment: nursing facility  Previous level of function:  total care    Roles and Routines:  Equipment Used at home: wheelchair  Assistance upon Discharge: total care  Pain/Comfort:  Pain Rating 1: 0/10    Patients cultural, spiritual, Scientology conflicts given the current situation:      Objective:     Communicated with: nurse and epic chart review prior to session.  Patient found HOB elevated with   upon OT entry to room.    General Precautions: Standard, aspiration  Orthopedic Precautions: N/A  Braces: N/A  Respiratory Status: Room air     Occupational Performance:    Bed Mobility:    Patient completed Rolling/Turning to Left with  total assistance  Patient completed Rolling/Turning to Right with total assistance    Activities of Daily Living:  Upper Body Dressing: total assistance .  Lower Body Dressing: total assistance .    Cognitive/Visual Perceptual:  Cognitive/Psychosocial Skills:     -       Oriented to: none   -       Follows Commands/attention:Inattentive  -       Communication: none  -       Memory:  Impaired STM, Impaired LTM, and Poor immediate recall  -       Safety awareness/insight to disability: impaired     Physical Exam:  Upper Extremity Range of Motion:     -       Right Upper Extremity: LIMITED  -       Left Upper Extremity: LIMITED    AMPAC 6 Click ADL:  AMPAC Total Score: 6    Treatment & Education:  Pt discharged from skilled O.T. due to no potential functional gains     Patient left left sidelying with all lines intact, call button in reach, nurse notified, and family present    GOALS:   Multidisciplinary Problems       Occupational Therapy Goals          Problem: Occupational Therapy    Goal Priority Disciplines Outcome Interventions   Occupational Therapy Goal     OT, PT/OT                         History:     Past Medical History:   Diagnosis Date    Alzheimer's dementia     Anxiety disorder, unspecified     Bladder cancer     CKD (chronic kidney disease) stage 3, GFR 30-59 ml/min     Gout, unspecified     Hypertension     PTSD (post-traumatic stress disorder)          Past Surgical History:   Procedure Laterality Date    COLONOSCOPY      ESOPHAGOGASTRODUODENOSCOPY      HAND SURGERY Right     JOINT REPLACEMENT         Time Tracking:     OT Date of Treatment: 09/25/24  OT Start Time: 1210  OT Stop Time: 1233  OT Total Time (min): 23 min    Billable Minutes:Evaluation 10 minutes  Therapeutic Activity 13 minutes    9/25/2024

## 2024-09-25 NOTE — PLAN OF CARE
[facility] - Med Surg  Initial Discharge Assessment       Primary Care Provider: No, Primary Doctor    Admission Diagnosis: Altered mental status [R41.82]  Chest pain [R07.9]  Severe sepsis [A41.9, R65.20]  Pressure injury of skin of right heel, unspecified injury stage [L89.619]  Pressure injury of skin of sacral region, unspecified injury stage [L89.159]    Admission Date: 9/24/2024  Expected Discharge Date: per attending    Transition of Care Barriers: None    Payor: [employer] [facility] / Plan: [employer] ADV / Product Type: Medicare Advantage /     Extended Emergency Contact Information  Primary Emergency Contact: Angelo Washington  Mobile Phone: [phone]  Relation: Son   needed? No  Secondary Emergency Contact: AMPARO WASHINGTON  Mobile Phone: [phone]  Relation: Son   needed? No    Discharge Plan A: Return to nursing home       No Pharmacies Listed    Initial Assessment (most recent)       Adult Discharge Assessment - 09/25/24 1136          Discharge Assessment    Assessment Type Discharge Planning Assessment     Confirmed/corrected address, phone number and insurance Yes     Confirmed Demographics Correct on Facesheet     Source of Information family     Does patient/caregiver understand observation status Yes     Communicated BRODIE with patient/caregiver Date not available/Unable to determine     Reason For Admission acute metabolic encephalopathy     People in Home alone     Facility Arrived From: [facility]     Do you expect to return to your current living situation? Yes     Do you have help at home or someone to help you manage your care at home? Yes     Who are your caregiver(s) and their phone number(s)? [facility]     Prior to hospitilization cognitive status: Unable to Assess     Current cognitive status: Unable to Assess     Walking or Climbing Stairs Difficulty no     Dressing/Bathing Difficulty no     Home Accessibility  wheelchair accessible     Home Layout Able to live on 1st floor     Equipment Currently Used at Home wheelchair     Readmission within 30 days? No     Do you currently have service(s) that help you manage your care at home? No     Is the pt/caregiver preference to resume services with current agency Yes     Do you take prescription medications? Yes     Do you have prescription coverage? Yes     [facility]     Do you have any problems affording any of your prescribed medications? No     Is the patient taking medications as prescribed? yes     Who is going to help you get home at discharge? [facility]     How do you get to doctors appointments? agency     Are you on dialysis? No     Do you take coumadin? No     Discharge Plan A Return to nursing home     DME Needed Upon Discharge  none     Discharge Plan discussed with: Adult children     Transition of Care Barriers None                   Sw Intern met with pt along bedside to discuss treatment and d/c plan. SW Intern received information from son. Family concerned with [facility] Billing regarding insurance.

## 2024-09-25 NOTE — PROGRESS NOTES
[facility] Medicine  Progress Note    Patient Name: Gabo Altamirano  MRN: [id number]  Patient Class: IP- Inpatient   Admission Date: 9/24/2024  Length of Stay: 1 days  Attending Physician: Alyson Reis MD  Primary Care Provider: Maru, Primary Doctor        Subjective:     Principal Problem:Late onset Alzheimer's dementia with mood disturbance        HPI:  79-year-old white man nursing home resident with history of near-syncope, dehydration, Alzheimer's dementia, UTI, gout, hypertension, chronic kidney disease stage IIIA, PTSD, generalized anxiety disorder, and bladder cancer who was sent for confusion over the last 1-2 days with difficulty arousing the patient today.  Symptoms are constant, moderate severity, and with no aggravating or alleviating factors identified.  Patient has had no witnessed vomiting or diarrhea.  Patient is currently alert and oriented x 0.  All information was obtained from chart review and ER physician.  He is arousable to voice and sternal rub.  Abnormal labs include white blood cell count 18.39, lactic acid level 3.3, glucose 128.  Chest x-ray, CT scan of abdomen and pelvis, CT scan of head, and urinalysis with no localizing source of infection identified.  Patient does have sacral decubitus ulcer and bilateral heel decubiti (right greater than left) as the only source of infection at this time.  He is currently afebrile.    Last hospital admit 7/15-07/17/2024 for hypotension, dehydration, near syncope, and UTI.    Overview/Hospital Course:  Patient admitted with worsening mental status (underlying dementia) with leukocytosis, elevated lactic acid, and negative workup other than a sacral decubitus and bilateral heel decubiti.  Leukocytosis and lactic acid did resolve on hospital day 1.  Mental status did slightly improve per son at bedside.  We discussed hospice care at his nursing home, [facility], which son is agreeable to at this time.  After discussion of hospice  options patient's son agreed to a informational visit from [facility] hospice.  Plan for return to nursing home with hospice tomorrow.  Of note, patient has advanced dementia with contracture of the limbs, decreased oral intake, and minimally verbal.    Interval History:  Follow up for advanced dementia and infected decubitus ulcers.  After further discussion with family they would like to go hospice and we will be meeting with the representative tomorrow.    Review of Systems  Objective:     Vital Signs (Most Recent):  Temp: 98.8 °F (37.1 °C) (09/25/24 1251)  Pulse: 70 (09/25/24 1301)  Resp: 18 (09/25/24 1251)  BP: 129/74 (09/25/24 1301)  SpO2: 95 % (09/25/24 1251) Vital Signs (24h Range):  Temp:  [98.4 °F (36.9 °C)-98.8 °F (37.1 °C)] 98.8 °F (37.1 °C)  Pulse:  [] 70  Resp:  [16-32] 18  SpO2:  [94 %-100 %] 95 %  BP: ()/(50-91) 129/74     Weight: 83.4 kg (183 lb 13.8 oz)  Body mass index is 26.38 kg/m².    Intake/Output Summary (Last 24 hours) at 9/25/2024 1526  Last data filed at 9/25/2024 1000  Gross per 24 hour   Intake 3602 ml   Output 1350 ml   Net 2252 ml         Physical Exam  Vitals and nursing note reviewed.   Constitutional:       Comments: Cachectic and chronically ill-appearing   HENT:      Head: Normocephalic and atraumatic.      Nose: Nose normal.      Mouth/Throat:      Mouth: Mucous membranes are moist.   Eyes:      Extraocular Movements: Extraocular movements intact.      Conjunctiva/sclera: Conjunctivae normal.   Cardiovascular:      Rate and Rhythm: Normal rate and regular rhythm.      Pulses: Normal pulses.      Heart sounds: Normal heart sounds.   Pulmonary:      Effort: Pulmonary effort is normal.      Breath sounds: Normal breath sounds.   Abdominal:      General: Abdomen is flat. Bowel sounds are normal.      Palpations: Abdomen is soft.   Musculoskeletal:         General: Tenderness present.      Cervical back: Normal range of motion and neck supple.      Comments: Left shoulder  tenderness   Skin:     General: Skin is warm.      Capillary Refill: Capillary refill takes less than 2 seconds.   Neurological:      Mental Status: He is alert. Mental status is at baseline.      Comments: Patient opens eyes to verbal stimuli, but no conversation can be held due to patient's advanced dementia   Psychiatric:         Mood and Affect: Mood normal.         Behavior: Behavior normal.             Significant Labs: All pertinent labs within the past 24 hours have been reviewed.  Recent Lab Results  (Last 5 results in the past 24 hours)        09/25/24  1128   09/25/24  0757   09/25/24  0546   09/25/24  0123   09/24/24 2054        Influenza A, Molecular         Negative       Influenza B, Molecular         Negative       Ammonia               Anion Gap   8             Baso #   0.10             Basophil %   0.9             BNP               BUN   8             Calcium   8.5             Chloride   113             CO2   22             Creatinine   0.7                0.7             CRP               Differential Method   Automated             eGFR   >60                >60             Eos #   0.2             Eos %   2.0             Estimated Avg Glucose               Flu A & B Source         Nasal swab       Folate               Free T4               Glucose   91             Gran # (ANC)   8.0             Gran %   73.2             Hematocrit   37.7             Hemoglobin   11.7             Hemoglobin A1C External               Immature Grans (Abs)   0.06  Comment: Mild elevation in immature granulocytes is non specific and   can be seen in a variety of conditions including stress response,   acute inflammation, trauma and pregnancy. Correlation with other   laboratory and clinical findings is essential.               Immature Granulocytes   0.5             Lactic Acid Level       1.3  Comment: Falsely low lactic acid results can be found in samples   containing >=13.0 mg/dL total bilirubin and/or >=3.5 mg/dL    direct bilirubin.           Lymph #   1.6             Lymph %   14.6             Magnesium                MCH   28.2             MCHC   31.0             MCV   91             Mono #   1.0             Mono %   8.8             MPV   9.5             nRBC   0             Platelet Count   316             POCT Glucose 110     90           Potassium   3.5             Prealbumin               RBC   4.15             RDW   13.9             SARS-CoV-2 RNA, Amplification, Qual         Negative  Comment: This test utilizes isothermal nucleic acid amplification technology   to   detect the SARS-CoV-2 RdRp nucleic acid segment. The analytical   sensitivity   (limit of detection) is 500 copies/swab.    A POSITIVE result is indicative of the presence of SARS-CoV-2 RNA;   clinical   correlation with patient history and other diagnostic information is   necessary to determine patient infection status.    A NEGATIVE result means that SARS-CoV-2 nucleic acids are not present   above   the limit of detection. A NEGATIVE result should be treated as   presumptive.   It does not rule out the possibility of COVID-19 and should not be   the sole   basis for treatment decisions.    If COVID-19 is strongly suspected based on clinical and exposure   history,   re-testing using an alternate molecular assay should be considered.    This test is Food and Drug Administration (FDA) approved. Performance   characteristics of this has been independently verified by [facility] Department of Pathology and Laboratory Medicine.         Sed Rate               Sodium   143             TSH               Vitamin B12               WBC   11.00                                    Significant Imaging:   US Lower Extremity Veins Bilateral   Final Result      Negative for bilateral lower extremity DVT.         Electronically signed by: Blade Giron MD   Date:    09/25/2024   Time:    12:30      X-Ray Abdomen AP 1 View   Final Result      Overall, no  significant interval change has occurred.         Electronically signed by: Lex Fraga MD   Date:    09/25/2024   Time:    07:06      X-Ray Calcaneus Bilateral   Final Result   Abnormal      Questionable osseous irregularity associated with the right posterolateral calcaneus which could relate to early osteomyelitis but is nonspecific.  Consider further evaluation with MRI.      This report was flagged in Epic as abnormal.         Electronically signed by: Brandon Hernandez   Date:    09/24/2024   Time:    21:14      CT Abdomen Pelvis  Without Contrast   Final Result      No definite acute abnormality identified.      Constipation versus impaction.      Complete findings as above.      All CT scans at this facility are performed  using dose modulation techniques as appropriate to performed exam including the following:  automated exposure control; adjustment of mA and/or kV according to the patients size (this includes techniques or standardized protocols for targeted exams where dose is matched to indication/reason for exam: i.e. extremities or head);  iterative reconstruction technique.         Electronically signed by: Brandon Hernandez   Date:    09/24/2024   Time:    18:31      CT Head Without Contrast   Final Result      1.  Negative for acute intracranial process. Negative for hemorrhage, or skull fracture.      2.  Marked cerebral atrophy noted.  Intracranial atherosclerotic disease noted.  Small vessel ischemic changes noted.      3.  Debris within both ear canals.      All CT scans at this facility are performed  using dose modulation techniques as appropriate to performed exam including the following:  automated exposure control; adjustment of mA and/or kV according to the patients size (this includes techniques or standardized protocols for targeted exams where dose is matched to indication/reason for exam: i.e. extremities or head);  iterative reconstruction technique.         Electronically signed by: Lex  MD [place]   Date:    09/24/2024   Time:    15:50      X-Ray Chest AP Portable   Final Result      1.  Negative for acute process involving the chest.      2.  Stable findings as noted above.         Electronically signed by: Lex Fraga MD   Date:    09/24/2024   Time:    15:37      X-ray Shoulder 2 or More Views Right    (Results Pending)        Assessment/Plan:      * Late onset Alzheimer's dementia with mood disturbance  Alzheimer's dementia with increased confusion from baseline  -currently NPO with IV fluids  -aspiration and fall precautions  -PT, OT, and ST evaluations  -Aricept, Namenda, Remeron, Depakote, and Risperdal  -discussed hospice with patient's sons at bedside.  Angelo, son, is patient's decision maker he will be speaking with [facility] in the morning plans for discharge to [facility] with [facility].    Hyperglycemia  Check A1c.  Blood glucose checks q.6 hours with general hypoglycemia protocol.  No sliding scale insulin at this time.      Bladder cancer  -p.r.n. bladder scans  -urinalysis negative  -BUN 12, creatinine 1.1      Primary hypertension  Chronic, controlled. Latest blood pressure and vitals reviewed-     Temp:  [97.8 °F (36.6 °C)-98.4 °F (36.9 °C)]   Pulse:  []   Resp:  [18-22]   BP: (130-176)/(69-99)   SpO2:  [96 %-97 %] .   Home meds for hypertension were reviewed and noted below.   Hypertension Medications               amLODIPine (NORVASC) 10 MG tablet Take 10 mg by mouth once daily.    furosemide (LASIX) 20 MG tablet Take 20 mg by mouth daily as needed.            While in the hospital, will manage blood pressure as follows; Adjust home antihypertensive regimen as follows- hold home medications while NPO.  P.r.n. IV hydralazine with parameters.    Will utilize p.r.n. blood pressure medication only if patient's blood pressure greater than 160/100 and he develops symptoms such as worsening chest pain or shortness of breath.    Constipation  -IV fluids  -give  "brown bomb enema x1 dose now, manually disimpact if needed  -p.r.n. Dulcolax suppositories  -check thyroid studies  -check KUB in a.m. - unchanged  -we will give a dose of Mag citrate and fleets enema      Decubitus ulcers  Sepsis with multiple decubitus ulcers to sacrum and bilateral heels (right greater than left)  -white blood cell count 18.39, lactic acid level 3.3, procalcitonin level 0.04, blood cultures pending, and currently afebrile  -check bilateral heel x-rays  -check ESR, CRP, A1c, BNP, and pre-albumin level  -check lactic acid level q.4 hours x2  -CT scan of abdomen and pelvis with no mention of bony involvement of sacrum  -empiric IV cefepime and IV vancomycin  -frequent turns q.2 hours, apply bilateral heel protectors  -check bilateral lower extremity venous ultrasound given bilateral edema right greater than left  -consult Wound Care  -consult Infectious Disease      Sepsis  This patient does have evidence of infective focus  My overall impression is sepsis.  Source: Skin and Soft Tissue (location multiple decubitus ulcers to sacrum and bilateral heels)  Antibiotics given-   Antibiotics (72h ago, onward)      Start     Stop Route Frequency Ordered    09/25/24 1000  vancomycin 1,250 mg in D5W 250 mL IVPB (admixture device)         -- IV Every 12 hours (non-standard times) 09/25/24 0907    09/25/24 0130  ceFEPIme (MAXIPIME) 2 g in D5W 100 mL IVPB (MB+)         -- IV Every 8 hours (non-standard times) 09/24/24 1950    09/24/24 2037  vancomycin - pharmacy to dose  (vancomycin IVPB (PEDS and ADULTS))        Placed in "And" Linked Group    -- IV pharmacy to manage frequency 09/24/24 1950          Latest lactate reviewed-  Recent Labs   Lab 09/25/24  0123   LACTATE 1.3       Organ dysfunction not present    Fluid challenge Not needed - patient is not hypotensive      Post- resuscitation assessment No - Post resuscitation assessment not needed       Will Not start Pressors- Levophed for MAP of 65  Source " "control achieved by:  Patient received 3502 mL normal saline IV fluid bolus ordered by the emergency department physician.  Continue IV cefepime and IV vancomycin.  - resolved    Acute metabolic encephalopathy  Acute metabolic encephalopathy that is likely multifactorial due to underlying sepsis/infection with multiple decubitus ulcers, constipation versus fecal impaction, polypharmacy, and underlying Alzheimer's dementia with possible progression  -CT scan of head with Negative for acute intracranial process. Negative for hemorrhage, or skull fracture. Marked cerebral atrophy noted.  Intracranial atherosclerotic disease noted.  Small vessel ischemic changes noted. Debris within both ear canals."  -CT scan of abdomen and pelvis with constipation versus impaction and otherwise negative  -white blood cell count 18.39, lactic acid level 3.3, procalcitonin level 0.04, blood cultures pending, urinalysis negative, troponin negative, creatinine 1.1, LFTs unremarkable, and currently afebrile  -IV fluids, broad-spectrum IV antibiotics  -check B12, folate, TSH, free T4, ammonia level, and UDS - review  -check influenza a/B and COVID-19 - negative      DANNY (generalized anxiety disorder)  See discussion for PTSD      PTSD (post-traumatic stress disorder)  PTSD and generalized anxiety disorder  -home medications       Stage 3a chronic kidney disease  Creatine stable for now. BMP reviewed- noted Estimated Creatinine Clearance: 88.4 mL/min (based on SCr of 0.7 mg/dL). according to latest data. Based on current GFR, CKD stage is stage 3 - GFR 30-59.  Monitor UOP and serial BMP and adjust therapy as needed. Renally dose meds. Avoid nephrotoxic medications and procedures.      VTE Risk Mitigation (From admission, onward)           Ordered     enoxaparin injection 40 mg  Daily         09/24/24 1950     IP VTE HIGH RISK PATIENT  Once         09/24/24 1950     Place sequential compression device  Until discontinued         09/24/24 " 1950                    Discharge Planning   BRODIE:      Code Status: Full Code   Is the patient medically ready for discharge?:     Reason for patient still in hospital (select all that apply): Patient trending condition, Treatment, and Pending disposition  Discharge Plan A: Return to nursing home                  Alyson Reis MD  Department of Hospital Medicine   '[facility] Med Surg

## 2024-09-25 NOTE — ASSESSMENT & PLAN NOTE
Alzheimer's dementia with increased confusion from baseline  -currently NPO with IV fluids  -aspiration and fall precautions  -PT, OT, and ST evaluations  -Aricept, Namenda, Remeron, Depakote, and Risperdal  -discussed hospice with patient's sons at bedside.  Angelo, son, is patient's decision maker he will be speaking with [facility] hospice in the morning plans for discharge to [facility] with [facility] hospice.

## 2024-09-25 NOTE — SUBJECTIVE & OBJECTIVE
Past Medical History:   Diagnosis Date    Alzheimer's dementia     Anxiety disorder, unspecified     Bladder cancer     CKD (chronic kidney disease) stage 3, GFR 30-59 ml/min     Gout, unspecified     Hypertension     PTSD (post-traumatic stress disorder)        Past Surgical History:   Procedure Laterality Date    COLONOSCOPY      ESOPHAGOGASTRODUODENOSCOPY      HAND SURGERY Right     JOINT REPLACEMENT         Review of patient's allergies indicates:  No Known Allergies    No current facility-administered medications on file prior to encounter.     Current Outpatient Medications on File Prior to Encounter   Medication Sig    acetaminophen (TYLENOL) 500 MG tablet Take 500 mg by mouth every 6 (six) hours as needed for Pain.    amLODIPine (NORVASC) 10 MG tablet Take 10 mg by mouth once daily.    ascorbic acid, vitamin C, (VITAMIN C) 500 MG tablet Take 500 mg by mouth once daily.    aspirin 81 MG Chew Take 81 mg by mouth once daily.    divalproex (DEPAKOTE) 125 MG EC tablet Take 125 mg by mouth 2 (two) times daily.    donepeziL (ARICEPT) 10 MG tablet Take 10 mg by mouth once daily.    famotidine (PEPCID) 20 MG tablet Take 20 mg by mouth 2 (two) times daily.    furosemide (LASIX) 20 MG tablet Take 20 mg by mouth daily as needed.    HYDROcodone-acetaminophen (NORCO) 5-325 mg per tablet Take 1 tablet by mouth every 12 (twelve) hours as needed.    lactulose (CHRONULAC) 10 gram/15 mL solution Take 30 mLs by mouth once daily.    memantine (NAMENDA) 10 MG Tab Take 10 mg by mouth 2 (two) times daily.    mirtazapine (REMERON) 7.5 MG Tab Take 7.5 mg by mouth every evening.    risperiDONE (RISPERDAL) 0.5 MG Tab Take 0.5 mg by mouth once daily.    risperiDONE (RISPERDAL) 1 MG tablet Take 1 mg by mouth every evening.    senna (SENOKOT) 8.6 mg tablet Take 2 tablets by mouth once daily.    zinc sulfate (ZINCATE) 50 mg zinc (220 mg) capsule Take 220 mg by mouth once daily.    [DISCONTINUED] melatonin (MELATIN) 3 mg tablet Take 3 mg by  mouth nightly.     Family History       Problem Relation (Age of Onset)    Cancer Mother, Father    Pancreatitis Father          Tobacco Use    Smoking status: Never    Smokeless tobacco: Former   Substance and Sexual Activity    Alcohol use: Not Currently    Drug use: Not Currently    Sexual activity: Not Currently     Review of Systems   Unable to perform ROS: Dementia     Objective:     Vital Signs (Most Recent):  Temp: 98.4 °F (36.9 °C) (09/24/24 1900)  Pulse: 109 (09/24/24 1900)  Resp: 20 (09/24/24 1900)  BP: 130/89 (09/24/24 1900)  SpO2: 97 % (09/24/24 1900) Vital Signs (24h Range):  Temp:  [97.8 °F (36.6 °C)-98.4 °F (36.9 °C)] 98.4 °F (36.9 °C)  Pulse:  [] 109  Resp:  [18-22] 20  SpO2:  [96 %-97 %] 97 %  BP: (130-176)/(69-99) 130/89     Weight: 83.4 kg (183 lb 13.8 oz)  Body mass index is 26.38 kg/m².     Physical Exam  Vitals reviewed.   Constitutional:       Appearance: He is ill-appearing. He is not diaphoretic.   HENT:      Nose: Nose normal.   Eyes:      Conjunctiva/sclera: Conjunctivae normal.   Cardiovascular:      Rate and Rhythm: Tachycardia present.   Pulmonary:      Effort: Pulmonary effort is normal. No respiratory distress.   Abdominal:      General: There is distension.      Tenderness: There is no abdominal tenderness.   Musculoskeletal:      Right lower leg: Edema present.      Left lower leg: Edema present.   Neurological:      Mental Status: He is alert. He is disoriented.   Psychiatric:      Comments: Lethargic, arousable                Significant Labs: All pertinent labs within the past 24 hours have been reviewed.  Recent Lab Results  (Last 5 results in the past 24 hours)        09/24/24  1852   09/24/24  1714   09/24/24  1640   09/24/24  1620   09/24/24  1534        Procalcitonin   0.04  Comment: A concentration < 0.25 ng/mL represents a low risk of bacterial   infection.  Procalcitonin may not be accurate among patients with localized   infection, recent trauma or major surgery,  immunosuppressed state,   invasive fungal infection, renal dysfunction. Decisions regarding   initiation or continuation of antibiotic therapy should not be based   solely on procalcitonin levels.               Albumin       3.1         ALP       92         ALT       13         Anion Gap       11         Appearance, UA     Clear           AST       23         Baso #       0.08         Basophil %       0.4         Bilirubin (UA)     Negative           BILIRUBIN TOTAL       0.4  Comment: For infants and newborns, interpretation of results should be based  on gestational age, weight and in agreement with clinical  observations.    Premature Infant recommended reference ranges:  Up to 24 hours.............<8.0 mg/dL  Up to 48 hours............<12.0 mg/dL  3-5 days..................<15.0 mg/dL  6-29 days.................<15.0 mg/dL           BUN       12         Calcium       10.0         Chloride       107         CO2       23         Color, UA     Yellow           Creatinine       1.1         Differential Method       Automated         eGFR       >60         Eos #       0.1         Eos %       0.3         Glucose       128         Glucose, UA     Negative           Gran # (ANC)       16.1         Gran %       87.8         Hematocrit       41.1         Hemoglobin       13.1         Immature Grans (Abs)       0.17  Comment: Mild elevation in immature granulocytes is non specific and   can be seen in a variety of conditions including stress response,   acute inflammation, trauma and pregnancy. Correlation with other   laboratory and clinical findings is essential.           Immature Granulocytes       0.9         Ketones, UA     Negative           Lactic Acid Level 1.8  Comment: Falsely low lactic acid results can be found in samples   containing >=13.0 mg/dL total bilirubin and/or >=3.5 mg/dL   direct bilirubin.         3.3  Comment: Falsely low lactic acid results can be found in samples   containing >=13.0 mg/dL total  bilirubin and/or >=3.5 mg/dL   direct bilirubin.           Leukocyte Esterase, UA     Negative           Lipase       28         Lymph #       1.0         Lymph %       5.4         MCH       28.8         MCHC       31.9         MCV       90         Mono #       1.0         Mono %       5.2         MPV       9.6         NITRITE UA     Negative           nRBC       0         Blood, UA     Negative           QRS Duration         92       [facility] QTC Calculation         474       pH, UA     6.0           Platelet Count       359         Potassium       4.5         PROTEIN TOTAL       7.2         Protein, UA     Trace  Comment: Recommend a 24 hour urine protein or a urine   protein/creatinine ratio if globulin induced proteinuria is  clinically suspected.             RBC       4.55         RDW       14.1         Sodium       141         Spec Grav UA     1.015           Specimen UA     Urine, Clean Catch           Troponin I       <0.006  Comment: The reference interval for Troponin I represents the 99th percentile   cutoff   for our facility and is consistent with 3rd generation assay   performance.           UROBILINOGEN UA     Negative           WBC       18.39                                Significant Imaging: I have reviewed all pertinent imaging results/findings within the past 24 hours.  CT Abdomen Pelvis  Without Contrast   Final Result      No definite acute abnormality identified.      Constipation versus impaction.      Complete findings as above.      All CT scans at this facility are performed  using dose modulation techniques as appropriate to performed exam including the following:  automated exposure control; adjustment of mA and/or kV according to the patients size (this includes techniques or standardized protocols for targeted exams where dose is matched to indication/reason for exam: i.e. extremities or head);  iterative reconstruction technique.         Electronically signed by: Brandon Hernandez    Date:    09/24/2024   Time:    18:31      CT Head Without Contrast   Final Result      1.  Negative for acute intracranial process. Negative for hemorrhage, or skull fracture.      2.  Marked cerebral atrophy noted.  Intracranial atherosclerotic disease noted.  Small vessel ischemic changes noted.      3.  Debris within both ear canals.      All CT scans at this facility are performed  using dose modulation techniques as appropriate to performed exam including the following:  automated exposure control; adjustment of mA and/or kV according to the patients size (this includes techniques or standardized protocols for targeted exams where dose is matched to indication/reason for exam: i.e. extremities or head);  iterative reconstruction technique.         Electronically signed by: Lex Fraga MD   Date:    09/24/2024   Time:    15:50      X-Ray Chest AP Portable   Final Result      1.  Negative for acute process involving the chest.      2.  Stable findings as noted above.         Electronically signed by: Lex Fraga MD   Date:    09/24/2024   Time:    15:37      X-Ray Pelvis Routine AP    (Results Pending)   X-Ray Calcaneus Bilateral    (Results Pending)   US Lower Extremity Veins Bilateral    (Results Pending)

## 2024-09-25 NOTE — PLAN OF CARE
Discussed poc with pt's two sons at the bedside    Purposeful rounding     VS wnl    Cardiac monitoring in use tele monitor # [id number]    Blood glucose monitoring     Fall precautions in place, remains injury free    Pt slept throughout the night without any problems. Pt was turned Q2 with no problems.  Tele monitor # [id number]    IVFs infusing    Accurate I&Os    Abx given as prescribed    Bed locked at lowest position    Call light within reach    Chart check continued    Will cont with POC

## 2024-09-25 NOTE — ASSESSMENT & PLAN NOTE
"This patient does have evidence of infective focus  My overall impression is sepsis.  Source: Skin and Soft Tissue (location multiple decubitus ulcers to sacrum and bilateral heels)  Antibiotics given-   Antibiotics (72h ago, onward)      Start     Stop Route Frequency Ordered    09/25/24 1000  vancomycin 1,250 mg in D5W 250 mL IVPB (admixture device)         -- IV Every 12 hours (non-standard times) 09/25/24 0907    09/25/24 0130  ceFEPIme (MAXIPIME) 2 g in D5W 100 mL IVPB (MB+)         -- IV Every 8 hours (non-standard times) 09/24/24 1950    09/24/24 2037  vancomycin - pharmacy to dose  (vancomycin IVPB (PEDS and ADULTS))        Placed in "And" Linked Group    -- IV pharmacy to manage frequency 09/24/24 1950          Latest lactate reviewed-  Recent Labs   Lab 09/25/24  0123   LACTATE 1.3       Organ dysfunction not present    Fluid challenge Not needed - patient is not hypotensive      Post- resuscitation assessment No - Post resuscitation assessment not needed       Will Not start Pressors- Levophed for MAP of 65  Source control achieved by:  Patient received 3502 mL normal saline IV fluid bolus ordered by the emergency department physician.  Continue IV cefepime and IV vancomycin.  - resolved  "

## 2024-09-26 VITALS
HEIGHT: 70 IN | WEIGHT: 179.69 LBS | SYSTOLIC BLOOD PRESSURE: 117 MMHG | RESPIRATION RATE: 16 BRPM | HEART RATE: 78 BPM | BODY MASS INDEX: 25.73 KG/M2 | DIASTOLIC BLOOD PRESSURE: 67 MMHG | TEMPERATURE: 98 F | OXYGEN SATURATION: 95 %

## 2024-09-26 LAB
BASOPHILS # BLD AUTO: 0.13 K/UL (ref 0–0.2)
BASOPHILS NFR BLD: 1.1 % (ref 0–1.9)
CREAT SERPL-MCNC: 0.7 MG/DL (ref 0.5–1.4)
DIFFERENTIAL METHOD BLD: ABNORMAL
EOSINOPHIL # BLD AUTO: 0.4 K/UL (ref 0–0.5)
EOSINOPHIL NFR BLD: 3 % (ref 0–8)
ERYTHROCYTE [DISTWIDTH] IN BLOOD BY AUTOMATED COUNT: 13.8 % (ref 11.5–14.5)
EST. GFR  (NO RACE VARIABLE): >60 ML/MIN/1.73 M^2
HCT VFR BLD AUTO: 35.6 % (ref 40–54)
HGB BLD-MCNC: 11.1 G/DL (ref 14–18)
IMM GRANULOCYTES # BLD AUTO: 0.05 K/UL (ref 0–0.04)
IMM GRANULOCYTES NFR BLD AUTO: 0.4 % (ref 0–0.5)
LYMPHOCYTES # BLD AUTO: 2.3 K/UL (ref 1–4.8)
LYMPHOCYTES NFR BLD: 19.7 % (ref 18–48)
MCH RBC QN AUTO: 28.2 PG (ref 27–31)
MCHC RBC AUTO-ENTMCNC: 31.2 G/DL (ref 32–36)
MCV RBC AUTO: 90 FL (ref 82–98)
MONOCYTES # BLD AUTO: 1.2 K/UL (ref 0.3–1)
MONOCYTES NFR BLD: 10.1 % (ref 4–15)
NEUTROPHILS # BLD AUTO: 7.6 K/UL (ref 1.8–7.7)
NEUTROPHILS NFR BLD: 65.7 % (ref 38–73)
NRBC BLD-RTO: 0 /100 WBC
PLATELET # BLD AUTO: 323 K/UL (ref 150–450)
PMV BLD AUTO: 9.8 FL (ref 9.2–12.9)
POCT GLUCOSE: 116 MG/DL (ref 70–110)
POCT GLUCOSE: 83 MG/DL (ref 70–110)
RBC # BLD AUTO: 3.94 M/UL (ref 4.6–6.2)
VANCOMYCIN TROUGH SERPL-MCNC: 25.6 UG/ML (ref 10–22)
WBC # BLD AUTO: 11.52 K/UL (ref 3.9–12.7)

## 2024-09-26 PROCEDURE — 82565 ASSAY OF CREATININE: CPT | Performed by: FAMILY MEDICINE

## 2024-09-26 PROCEDURE — 63600175 PHARM REV CODE 636 W HCPCS: Performed by: INTERNAL MEDICINE

## 2024-09-26 PROCEDURE — 80202 ASSAY OF VANCOMYCIN: CPT | Performed by: FAMILY MEDICINE

## 2024-09-26 PROCEDURE — 36415 COLL VENOUS BLD VENIPUNCTURE: CPT | Performed by: FAMILY MEDICINE

## 2024-09-26 PROCEDURE — 25000003 PHARM REV CODE 250: Performed by: INTERNAL MEDICINE

## 2024-09-26 PROCEDURE — 25000003 PHARM REV CODE 250: Performed by: FAMILY MEDICINE

## 2024-09-26 PROCEDURE — 85025 COMPLETE CBC W/AUTO DIFF WBC: CPT | Performed by: FAMILY MEDICINE

## 2024-09-26 RX ORDER — SODIUM CHLORIDE 9 MG/ML
INJECTION, SOLUTION INTRAVENOUS
Status: DISCONTINUED | OUTPATIENT
Start: 2024-09-26 | End: 2024-09-26 | Stop reason: HOSPADM

## 2024-09-26 RX ORDER — CEPHALEXIN 500 MG/1
500 CAPSULE ORAL EVERY 8 HOURS
Start: 2024-09-26 | End: 2024-10-01

## 2024-09-26 RX ADMIN — CEFEPIME 2 G: 2 INJECTION, POWDER, FOR SOLUTION INTRAVENOUS at 01:09

## 2024-09-26 RX ADMIN — SODIUM CHLORIDE: 9 INJECTION, SOLUTION INTRAVENOUS at 09:09

## 2024-09-26 RX ADMIN — DIVALPROEX SODIUM 125 MG: 125 CAPSULE, COATED PELLETS ORAL at 09:09

## 2024-09-26 RX ADMIN — DONEPEZIL HYDROCHLORIDE 10 MG: 5 TABLET, FILM COATED ORAL at 09:09

## 2024-09-26 RX ADMIN — CEFEPIME 2 G: 2 INJECTION, POWDER, FOR SOLUTION INTRAVENOUS at 09:09

## 2024-09-26 RX ADMIN — MEMANTINE 10 MG: 10 TABLET ORAL at 09:09

## 2024-09-26 RX ADMIN — ASPIRIN 81 MG CHEWABLE TABLET 81 MG: 81 TABLET CHEWABLE at 09:09

## 2024-09-26 RX ADMIN — RISPERIDONE 0.5 MG: 0.25 TABLET, FILM COATED ORAL at 09:09

## 2024-09-26 RX ADMIN — AMLODIPINE BESYLATE 10 MG: 10 TABLET ORAL at 09:09

## 2024-09-26 NOTE — PLAN OF CARE
Discussed plan of care with patient and this patient was able to , verbalized understanding.  Patient remains free from injury.  Safety and comfort precautions maintained this shift.   Call light and personal belongings within reach, bed in low position with bed wheels locked.  No s/s of acute distress.   Purposeful rounding continued this shift.  Pain levels are controlled per MD order. IVF infusing.  Cardiac monitoring in place.  Diet orders continued.  Blood glucose monitoring continued this shift.  Vital signs continued per order.  Q2 repositioning done by staff  Chart and orders review completed.   Patient education about care completed.     Problem: Infection  Goal: Absence of Infection Signs and Symptoms  Outcome: Progressing     Problem: Skin Injury Risk Increased  Goal: Skin Health and Integrity  Outcome: Progressing     Problem: Adult Inpatient Plan of Care  Goal: Plan of Care Review  Outcome: Progressing  Goal: Patient-Specific Goal (Individualized)  Outcome: Progressing  Goal: Absence of Hospital-Acquired Illness or Injury  Outcome: Progressing  Goal: Optimal Comfort and Wellbeing  Outcome: Progressing  Goal: Readiness for Transition of Care  Outcome: Progressing     Problem: Sepsis/Septic Shock  Goal: Optimal Coping  Outcome: Progressing  Goal: Absence of Bleeding  Outcome: Progressing  Goal: Blood Glucose Level Within Targeted Range  Outcome: Progressing  Goal: Absence of Infection Signs and Symptoms  Outcome: Progressing  Goal: Optimal Nutrition Intake  Outcome: Progressing     Problem: Wound  Goal: Optimal Coping  Outcome: Progressing  Goal: Optimal Functional Ability  Outcome: Progressing  Goal: Absence of Infection Signs and Symptoms  Outcome: Progressing  Goal: Improved Oral Intake  Outcome: Progressing  Goal: Optimal Pain Control and Function  Outcome: Progressing  Goal: Skin Health and Integrity  Outcome: Progressing  Goal: Optimal Wound Healing  Outcome: Progressing

## 2024-09-26 NOTE — PLAN OF CARE
[facility] - Med Surg  Discharge Final Note    Primary Care Provider: No, Primary Doctor    Expected Discharge Date: 9/26/2024    Final Discharge Note (most recent)       Final Note - 09/26/24 1115          Final Note    Assessment Type Final Discharge Note     Anticipated Discharge Disposition halfway Nursing Facility     Hospital Resources/Appts/Education Provided Post-Acute resouces added to AVS        Post-Acute Status    Post-Acute Authorization Hospice     Hospice Status Set-up Complete/Auth obtained     Discharge Delays None known at this time                      Follow-up providers       [facility]   Specialty: Hospice Services, Hospice and Palliative Medicine    [address] [place] [address]   Phone: [phone]       Next Steps: Follow up    Instructions: Please follow up medical director of [facility] hosp[facility]              After-discharge care                Destination       [facility]   Service: Nursing Home    [address] [facility] [address]   Phone: [phone]                             Plan for pt to return to [facility] today with [facility]. Hospice consents signed.     Sw notified Page with [facility] that pt will return today with [facility].  to sign a single case agreement for hospice.     Patient has no d/c needs at this time. Sw to follow up, as needed, for d/c planning purposes.

## 2024-09-26 NOTE — CONSULTS
Therapy with vancomycin complete and/or consult discontinued by provider.  Pharmacy will sign off, please re-consult as needed.    Thank you for allowing us to participate in this patient's care.     Justen Turner, PharmD 09/26/2024 12:50 PM

## 2024-09-26 NOTE — CONSULTS
Pharmacokinetic Assessment Follow Up: IV Vancomycin    Vancomycin serum concentration assessment(s):  The trough level was drawn correctly and can be used to guide therapy at this time. The measurement is above the desired definitive target range of 15 to 20 mcg/mL.    Vancomycin Regimen Plan:  - Discontinue the scheduled vancomycin regimen and re-dose when the random level is less than 20 mcg/mL  - Recommend to discontinue vancomycin prior to discharge      Drug levels (last 3 results):  Recent Labs   Lab Result Units 09/26/24  0846   Vancomycin-Trough ug/mL 25.6*       Pharmacy will continue to follow and monitor vancomycin.    Please contact pharmacy at extension [phone] for questions regarding this assessment.    Thank you for the consult,   Justen Turner PharmD     Patient brief summary:  Gabo Altamirano is a 79 y.o. male initiated on antimicrobial therapy with IV Vancomycin for treatment of skin & soft tissue infection    Drug Allergies:   Review of patient's allergies indicates:  No Known Allergies    Actual Body Weight: 81.5 kg    Renal Function:   Estimated Creatinine Clearance: 88.4 mL/min (based on SCr of 0.7 mg/dL).    Dialysis Method (if applicable): N/A    CBC (last 72 hours):  Recent Labs   Lab Result Units 09/24/24 1620 09/24/24 2045 09/25/24  0757 09/26/24  0457   WBC K/uL 18.39*  --  11.00 11.52   Hemoglobin g/dL 13.1*  --  11.7* 11.1*   Hemoglobin A1C %  --  4.9  --   --    Hematocrit % 41.1  --  37.7* 35.6*   Platelets K/uL 359  --  316 323   Gran % % 87.8*  --  73.2* 65.7   Lymph % % 5.4*  --  14.6* 19.7   Mono % % 5.2  --  8.8 10.1   Eosinophil % % 0.3  --  2.0 3.0   Basophil % % 0.4  --  0.9 1.1   Differential Method  Automated  --  Automated Automated       Metabolic Panel (last 72 hours):  Recent Labs   Lab Result Units 09/24/24  1620 09/24/24  1640 09/24/24 2045 09/25/24  0757 09/26/24  1057   Sodium mmol/L 141  --   --  143  --    Potassium mmol/L 4.5  --   --  3.5  --    Chloride  mmol/L 107  --   --  113*  --    CO2 mmol/L 23  --   --  22*  --    Glucose mg/dL 128*  --   --  91  --    Glucose, UA   --  Negative  --   --   --    BUN mg/dL 12  --   --  8  --    Creatinine mg/dL 1.1  --   --  0.7  0.7 0.7   Creatinine, Urine mg/dL  --  115.8  --   --   --    Albumin g/dL 3.1*  --   --   --   --    Total Bilirubin mg/dL 0.4  --   --   --   --    Alkaline Phosphatase U/L 92  --   --   --   --    AST U/L 23  --   --   --   --    ALT U/L 13  --   --   --   --    Magnesium mg/dL  --   --  1.9  --   --        Vancomycin Administrations:  vancomycin given in the last 96 hours                     vancomycin 1,250 mg in D5W 250 mL IVPB (admixture device) (mg) 1,250 mg New Bag 09/25/24 2202     1,250 mg New Bag  1027    vancomycin 2 g in dextrose 5 % 500 mL IVPB (mg) 2,000 mg New Bag 09/24/24 2052                    Microbiologic Results:  Microbiology Results (last 7 days)       Procedure Component Value Units Date/Time    Blood culture #2 **CANNOT BE ORDERED STAT** [[id number]] Collected: 09/24/24 1621    Order Status: Completed Specimen: Blood from Peripheral, Antecubital, Left Updated: 09/26/24 0613     Blood Culture, Routine No Growth to date      No Growth to date    Blood culture #1 **CANNOT BE ORDERED STAT** [[id number]] Collected: 09/24/24 1622    Order Status: Completed Specimen: Blood from Peripheral, Antecubital, Right Updated: 09/26/24 0613     Blood Culture, Routine No Growth to date      No Growth to date    Influenza A & B by Molecular [[id number]] Collected: 09/24/24 2054    Order Status: Completed Specimen: Nasopharyngeal Swab Updated: 09/24/24 2154     Influenza A, Molecular Negative     Influenza B, Molecular Negative     Flu A & B Source Nasal swab          Thank you for allowing us to participate in this patient's care.     Justen Turner, Chandra 09/26/2024 12:48 PM

## 2024-09-28 NOTE — DISCHARGE SUMMARY
[facility] [place] [facility]  Discharge Summary      Patient Name: Gabo Altamirano  MRN: [id number]  NICO: [id number]  Patient Class: IP- Inpatient  Admission Date: 9/24/2024  Hospital Length of Stay: 2 days  Discharge Date and Time: 9/26/2024  1:26 PM  Attending Physician: Maru att. providers found   Discharging Provider: Alyson Reis MD  Primary Care Provider: Maru, Primary Doctor    Primary Care Team: Networked reference to record PCT     HPI:   79-year-old white man nursing home resident with history of near-syncope, dehydration, Alzheimer's dementia, UTI, gout, hypertension, chronic kidney disease stage IIIA, PTSD, generalized anxiety disorder, and bladder cancer who was sent for confusion over the last 1-2 days with difficulty arousing the patient today.  Symptoms are constant, moderate severity, and with no aggravating or alleviating factors identified.  Patient has had no witnessed vomiting or diarrhea.  Patient is currently alert and oriented x 0.  All information was obtained from chart review and ER physician.  He is arousable to voice and sternal rub.  Abnormal labs include white blood cell count 18.39, lactic acid level 3.3, glucose 128.  Chest x-ray, CT scan of abdomen and pelvis, CT scan of head, and urinalysis with no localizing source of infection identified.  Patient does have sacral decubitus ulcer and bilateral heel decubiti (right greater than left) as the only source of infection at this time.  He is currently afebrile.    Last hospital admit 7/15-07/17/2024 for hypotension, dehydration, near syncope, and UTI.    * No surgery found *      Hospital Course:   Patient admitted with worsening mental status (underlying dementia) with leukocytosis, elevated lactic acid, and negative workup other than a sacral decubitus and bilateral heel decubiti.  Leukocytosis and lactic acid did resolve on hospital day 1.  Mental status did slightly improve per son at bedside.  We discussed hospice care at  his nursing home, [facility], which son is agreeable to at this time.  After discussion of hospice options patient's son agreed to a informational visit from [facility] hospice.  Plan for return to nursing home with hospice.  Of note, patient has advanced dementia with contracture of the limbs, decreased oral intake, and minimally verbal.  Patient was seen and examined on day of discharge.  POC dw son at bedside and patient was discharge back to [facility] with [facility] hospice.     Goals of Care Treatment Preferences:  Code Status: DNR         Consults:   Consults (From admission, onward)          Status Ordering Provider     Inpatient consult to Social Work  Once        Provider:  (Not yet assigned)    Completed PAIGE BANKS            Endocrine  Hyperglycemia  Check A1c.  Blood glucose checks q.6 hours with general hypoglycemia protocol.  No sliding scale insulin at this time.        Final Active Diagnoses:    Diagnosis Date Noted POA    PRINCIPAL PROBLEM:  Late onset Alzheimer's dementia with mood disturbance [G30.1, F02.83] 06/21/2024 Yes    Acute metabolic encephalopathy [G93.41] 09/24/2024 Yes    Sepsis [A41.9] 09/24/2024 Yes    Decubitus ulcers [L89.90] 09/24/2024 Yes    Constipation [K59.00] 09/24/2024 Yes    Primary hypertension [I10] 09/24/2024 Yes    Bladder cancer [C67.9] 09/24/2024 Yes    Hyperglycemia [R73.9] 09/24/2024 Yes    Stage 3a chronic kidney disease [N18.31] 07/15/2024 Yes    PTSD (post-traumatic stress disorder) [F43.10] 07/15/2024 Yes    DANNY (generalized anxiety disorder) [F41.1] 07/15/2024 Yes      Problems Resolved During this Admission:       Discharged Condition: poor    Disposition: Hospice/Home    Follow Up:   Contact information for follow-up providers       [facility].    Specialties: Hospice Services, Hospice and Palliative Medicine  Why: Please follow up medical director of [facility] hospice  Contact information:  [address] [place]  "[place] [id number]  [phone]                       Contact information for after-discharge care       Destination       [facility] .    Service: Nursing Home  Contact information:  [address] [address] [phone]                                 Patient Instructions:      Diet Dysphagia Mechanical Soft     Change dressing (specify)   Order Comments: Please follow recs provided by wound care nurse.     Activity as tolerated       Significant Diagnostic Studies: Labs: BMP: No results for input(s): "GLU", "NA", "K", "CL", "CO2", "BUN", "CREATININE", "CALCIUM", "MG" in the last 48 hours. and CBC No results for input(s): "WBC", "HGB", "HCT", "PLT" in the last 48 hours.  Radiology:   X-ray Shoulder 2 or More Views Right   Final Result      Degenerative changes.  No acute findings.         Electronically signed by: Blade Giron MD   Date:    09/25/2024   Time:    16:14      US Lower Extremity Veins Bilateral   Final Result      Negative for bilateral lower extremity DVT.         Electronically signed by: Blade Giron MD   Date:    09/25/2024   Time:    12:30      X-Ray Abdomen AP 1 View   Final Result      Overall, no significant interval change has occurred.         Electronically signed by: Lex Fraga MD   Date:    09/25/2024   Time:    07:06      X-Ray Calcaneus Bilateral   Final Result   Abnormal      Questionable osseous irregularity associated with the right posterolateral calcaneus which could relate to early osteomyelitis but is nonspecific.  Consider further evaluation with MRI.      This report was flagged in Epic as abnormal.         Electronically signed by: Brandon Hernandez   Date:    09/24/2024   Time:    21:14      CT Abdomen Pelvis  Without Contrast   Final Result      No definite acute abnormality identified.      Constipation versus impaction.      Complete findings as above.      All CT scans at this facility are performed  using dose modulation techniques as " appropriate to performed exam including the following:  automated exposure control; adjustment of mA and/or kV according to the patients size (this includes techniques or standardized protocols for targeted exams where dose is matched to indication/reason for exam: i.e. extremities or head);  iterative reconstruction technique.         Electronically signed by: Brandon Hernandez   Date:    09/24/2024   Time:    18:31      CT Head Without Contrast   Final Result      1.  Negative for acute intracranial process. Negative for hemorrhage, or skull fracture.      2.  Marked cerebral atrophy noted.  Intracranial atherosclerotic disease noted.  Small vessel ischemic changes noted.      3.  Debris within both ear canals.      All CT scans at this facility are performed  using dose modulation techniques as appropriate to performed exam including the following:  automated exposure control; adjustment of mA and/or kV according to the patients size (this includes techniques or standardized protocols for targeted exams where dose is matched to indication/reason for exam: i.e. extremities or head);  iterative reconstruction technique.         Electronically signed by: Lex Fraga MD   Date:    09/24/2024   Time:    15:50      X-Ray Chest AP Portable   Final Result      1.  Negative for acute process involving the chest.      2.  Stable findings as noted above.         Electronically signed by: Lex Fraga MD   Date:    09/24/2024   Time:    15:37           Pending Diagnostic Studies:       None           Medications:  Reconciled Home Medications:      Medication List        START taking these medications      cephALEXin 500 MG capsule  Commonly known as: KEFLEX  Take 1 capsule (500 mg total) by mouth every 8 (eight) hours. for 5 days            CONTINUE taking these medications      acetaminophen 500 MG tablet  Commonly known as: TYLENOL  Take 500 mg by mouth every 6 (six) hours as needed for Pain.     amLODIPine 10 MG  tablet  Commonly known as: NORVASC  Take 10 mg by mouth once daily.     aspirin 81 MG Chew  Take 81 mg by mouth once daily.     divalproex 125 MG EC tablet  Commonly known as: DEPAKOTE  Take 125 mg by mouth 2 (two) times daily.     famotidine 20 MG tablet  Commonly known as: PEPCID  Take 20 mg by mouth 2 (two) times daily.     furosemide 20 MG tablet  Commonly known as: LASIX  Take 20 mg by mouth daily as needed.     HYDROcodone-acetaminophen 5-325 mg per tablet  Commonly known as: NORCO  Take 1 tablet by mouth every 12 (twelve) hours as needed.     lactulose 10 gram/15 mL solution  Commonly known as: CHRONULAC  Take 30 mLs by mouth once daily.     mirtazapine 7.5 MG Tab  Commonly known as: REMERON  Take 7.5 mg by mouth every evening.     * risperiDONE 0.5 MG Tab  Commonly known as: RISPERDAL  Take 0.5 mg by mouth once daily.     * risperiDONE 1 MG tablet  Commonly known as: RISPERDAL  Take 1 mg by mouth every evening.     senna 8.6 mg tablet  Commonly known as: SENOKOT  Take 2 tablets by mouth once daily.     VITAMIN C 500 MG tablet  Generic drug: ascorbic acid (vitamin C)  Take 500 mg by mouth once daily.     zinc sulfate 50 mg zinc (220 mg) capsule  Commonly known as: ZINCATE  Take 220 mg by mouth once daily.           * This list has 2 medication(s) that are the same as other medications prescribed for you. Read the directions carefully, and ask your doctor or other care provider to review them with you.                STOP taking these medications      donepeziL 10 MG tablet  Commonly known as: ARICEPT     memantine 10 MG Tab  Commonly known as: NAMENDA              Indwelling Lines/Drains at time of discharge:   Lines/Drains/Airways       None                   Time spent on the discharge of patient: 40 minutes         Alyson Reis MD  Department of Hospital Medicine  [facility] Med [facility]

## 2024-09-30 LAB
BACTERIA BLD CULT: NORMAL
BACTERIA BLD CULT: NORMAL

## 2024-10-07 NOTE — PHYSICIAN QUERY
Due to the conflicting clinical picture, please clinically validate the diagnosis of Severe Sepsis/Sepsis. If validated, please provide additional clinical support for the diagnosis:   The condition is confirmed. Please specify clinical support (signs, symptoms, and treatment) for the confirmed diagnosis: Elevated wbc, tachycardia, and low temp along with decubitus ulcer of the right heel and buttock.

## 2024-10-10 NOTE — SUBJECTIVE & OBJECTIVE
Interval History:  Follow up for advanced dementia and infected decubitus ulcers.  After further discussion with family they would like to go hospice and we will be meeting with the representative tomorrow.    Review of Systems  Objective:     Vital Signs (Most Recent):  Temp: 98.8 °F (37.1 °C) (09/25/24 1251)  Pulse: 70 (09/25/24 1301)  Resp: 18 (09/25/24 1251)  BP: 129/74 (09/25/24 1301)  SpO2: 95 % (09/25/24 1251) Vital Signs (24h Range):  Temp:  [98.4 °F (36.9 °C)-98.8 °F (37.1 °C)] 98.8 °F (37.1 °C)  Pulse:  [] 70  Resp:  [16-32] 18  SpO2:  [94 %-100 %] 95 %  BP: ()/(50-91) 129/74     Weight: 83.4 kg (183 lb 13.8 oz)  Body mass index is 26.38 kg/m².    Intake/Output Summary (Last 24 hours) at 9/25/2024 1526  Last data filed at 9/25/2024 1000  Gross per 24 hour   Intake 3602 ml   Output 1350 ml   Net 2252 ml         Physical Exam  Vitals and nursing note reviewed.   Constitutional:       Comments: Cachectic and chronically ill-appearing   HENT:      Head: Normocephalic and atraumatic.      Nose: Nose normal.      Mouth/Throat:      Mouth: Mucous membranes are moist.   Eyes:      Extraocular Movements: Extraocular movements intact.      Conjunctiva/sclera: Conjunctivae normal.   Cardiovascular:      Rate and Rhythm: Normal rate and regular rhythm.      Pulses: Normal pulses.      Heart sounds: Normal heart sounds.   Pulmonary:      Effort: Pulmonary effort is normal.      Breath sounds: Normal breath sounds.   Abdominal:      General: Abdomen is flat. Bowel sounds are normal.      Palpations: Abdomen is soft.   Musculoskeletal:         General: Tenderness present.      Cervical back: Normal range of motion and neck supple.      Comments: Left shoulder tenderness   Skin:     General: Skin is warm.      Capillary Refill: Capillary refill takes less than 2 seconds.   Neurological:      Mental Status: He is alert. Mental status is at baseline.      Comments: Patient opens eyes to verbal stimuli, but no  conversation can be held due to patient's advanced dementia   Psychiatric:         Mood and Affect: Mood normal.         Behavior: Behavior normal.             Significant Labs: All pertinent labs within the past 24 hours have been reviewed.  Recent Lab Results  (Last 5 results in the past 24 hours)        09/25/24  1128   09/25/24  0757   09/25/24  0546   09/25/24  0123   09/24/24  2054        Influenza A, Molecular         Negative       Influenza B, Molecular         Negative       Ammonia               Anion Gap   8             Baso #   0.10             Basophil %   0.9             BNP               BUN   8             Calcium   8.5             Chloride   113             CO2   22             Creatinine   0.7                0.7             CRP               Differential Method   Automated             eGFR   >60                >60             Eos #   0.2             Eos %   2.0             Estimated Avg Glucose               Flu A & B Source         Nasal swab       Folate               Free T4               Glucose   91             Gran # (ANC)   8.0             Gran %   73.2             Hematocrit   37.7             Hemoglobin   11.7             Hemoglobin A1C External               Immature Grans (Abs)   0.06  Comment: Mild elevation in immature granulocytes is non specific and   can be seen in a variety of conditions including stress response,   acute inflammation, trauma and pregnancy. Correlation with other   laboratory and clinical findings is essential.               Immature Granulocytes   0.5             Lactic Acid Level       1.3  Comment: Falsely low lactic acid results can be found in samples   containing >=13.0 mg/dL total bilirubin and/or >=3.5 mg/dL   direct bilirubin.           Lymph #   1.6             Lymph %   14.6             Magnesium                MCH   28.2             MCHC   31.0             MCV   91             Mono #   1.0             Mono %   8.8             MPV   9.5             nRBC    0             Platelet Count   316             POCT Glucose 110     90           Potassium   3.5             Prealbumin               RBC   4.15             RDW   13.9             SARS-CoV-2 RNA, Amplification, Qual         Negative  Comment: This test utilizes isothermal nucleic acid amplification technology   to   detect the SARS-CoV-2 RdRp nucleic acid segment. The analytical   sensitivity   (limit of detection) is 500 copies/swab.    A POSITIVE result is indicative of the presence of SARS-CoV-2 RNA;   clinical   correlation with patient history and other diagnostic information is   necessary to determine patient infection status.    A NEGATIVE result means that SARS-CoV-2 nucleic acids are not present   above   the limit of detection. A NEGATIVE result should be treated as   presumptive.   It does not rule out the possibility of COVID-19 and should not be   the sole   basis for treatment decisions.    If COVID-19 is strongly suspected based on clinical and exposure   history,   re-testing using an alternate molecular assay should be considered.    This test is Food and Drug Administration (FDA) approved. Performance   characteristics of this has been independently verified by [facility] Department of Pathology and Laboratory Medicine.         Sed Rate               Sodium   143             TSH               Vitamin B12               WBC   11.00                                    Significant Imaging:   US Lower Extremity Veins Bilateral   Final Result      Negative for bilateral lower extremity DVT.         Electronically signed by: Blade Giron MD   Date:    09/25/2024   Time:    12:30      X-Ray Abdomen AP 1 View   Final Result      Overall, no significant interval change has occurred.         Electronically signed by: Lex Fraga MD   Date:    09/25/2024   Time:    07:06      X-Ray Calcaneus Bilateral   Final Result   Abnormal      Questionable osseous irregularity associated with the right  posterolateral calcaneus which could relate to early osteomyelitis but is nonspecific.  Consider further evaluation with MRI.      This report was flagged in Epic as abnormal.         Electronically signed by: Brandon Hernandez   Date:    09/24/2024   Time:    21:14      CT Abdomen Pelvis  Without Contrast   Final Result      No definite acute abnormality identified.      Constipation versus impaction.      Complete findings as above.      All CT scans at this facility are performed  using dose modulation techniques as appropriate to performed exam including the following:  automated exposure control; adjustment of mA and/or kV according to the patients size (this includes techniques or standardized protocols for targeted exams where dose is matched to indication/reason for exam: i.e. extremities or head);  iterative reconstruction technique.         Electronically signed by: Brandon Hernandez   Date:    09/24/2024   Time:    18:31      CT Head Without Contrast   Final Result      1.  Negative for acute intracranial process. Negative for hemorrhage, or skull fracture.      2.  Marked cerebral atrophy noted.  Intracranial atherosclerotic disease noted.  Small vessel ischemic changes noted.      3.  Debris within both ear canals.      All CT scans at this facility are performed  using dose modulation techniques as appropriate to performed exam including the following:  automated exposure control; adjustment of mA and/or kV according to the patients size (this includes techniques or standardized protocols for targeted exams where dose is matched to indication/reason for exam: i.e. extremities or head);  iterative reconstruction technique.         Electronically signed by: Lex Fraga MD   Date:    09/24/2024   Time:    15:50      X-Ray Chest AP Portable   Final Result      1.  Negative for acute process involving the chest.      2.  Stable findings as noted above.         Electronically signed by: Lex Fraga MD    Date:    09/24/2024   Time:    15:37      X-ray Shoulder 2 or More Views Right    (Results Pending)       ANA